# Patient Record
Sex: FEMALE | Race: BLACK OR AFRICAN AMERICAN | Employment: UNEMPLOYED | URBAN - METROPOLITAN AREA
[De-identification: names, ages, dates, MRNs, and addresses within clinical notes are randomized per-mention and may not be internally consistent; named-entity substitution may affect disease eponyms.]

---

## 2017-02-24 ENCOUNTER — HOSPITAL ENCOUNTER (EMERGENCY)
Age: 22
Discharge: HOME | End: 2017-02-24
Attending: EMERGENCY MEDICINE
Payer: MEDICAID

## 2017-02-24 VITALS
DIASTOLIC BLOOD PRESSURE: 85 MMHG | SYSTOLIC BLOOD PRESSURE: 147 MMHG | OXYGEN SATURATION: 100 % | HEIGHT: 63 IN | WEIGHT: 110 LBS | BODY MASS INDEX: 19.49 KG/M2 | TEMPERATURE: 98.3 F | RESPIRATION RATE: 16 BRPM | HEART RATE: 97 BPM

## 2017-02-24 DIAGNOSIS — N30.00 ACUTE CYSTITIS WITHOUT HEMATURIA: Primary | ICD-10-CM

## 2017-02-24 LAB
APPEARANCE UR: ABNORMAL
BACTERIA URNS QL MICRO: ABNORMAL /HPF
BILIRUB UR QL: NEGATIVE
CLUE CELLS VAG QL WET PREP: NORMAL
COLOR UR: ABNORMAL
EPITH CASTS URNS QL MICRO: ABNORMAL /LPF
GLUCOSE UR STRIP.AUTO-MCNC: NEGATIVE MG/DL
HCG UR QL: NEGATIVE
HGB UR QL STRIP: NEGATIVE
KETONES UR QL STRIP.AUTO: NEGATIVE MG/DL
KOH PREP SPEC: NORMAL
LEUKOCYTE ESTERASE UR QL STRIP.AUTO: ABNORMAL
NITRITE UR QL STRIP.AUTO: POSITIVE
PH UR STRIP: 6 [PH] (ref 5–8)
PROT UR STRIP-MCNC: NEGATIVE MG/DL
RBC #/AREA URNS HPF: ABNORMAL /HPF (ref 0–5)
SERVICE CMNT-IMP: NORMAL
SP GR UR REFRACTOMETRY: 1.02 (ref 1–1.03)
T VAGINALIS VAG QL WET PREP: NORMAL
UA: UC IF INDICATED,UAUC: ABNORMAL
UROBILINOGEN UR QL STRIP.AUTO: 1 EU/DL (ref 0.2–1)
WBC URNS QL MICRO: ABNORMAL /HPF (ref 0–4)

## 2017-02-24 PROCEDURE — 99284 EMERGENCY DEPT VISIT MOD MDM: CPT

## 2017-02-24 PROCEDURE — 87086 URINE CULTURE/COLONY COUNT: CPT | Performed by: EMERGENCY MEDICINE

## 2017-02-24 PROCEDURE — 87210 SMEAR WET MOUNT SALINE/INK: CPT | Performed by: PHYSICIAN ASSISTANT

## 2017-02-24 PROCEDURE — 87077 CULTURE AEROBIC IDENTIFY: CPT | Performed by: EMERGENCY MEDICINE

## 2017-02-24 PROCEDURE — 87186 SC STD MICRODIL/AGAR DIL: CPT | Performed by: EMERGENCY MEDICINE

## 2017-02-24 PROCEDURE — 87491 CHLMYD TRACH DNA AMP PROBE: CPT | Performed by: PHYSICIAN ASSISTANT

## 2017-02-24 PROCEDURE — 81025 URINE PREGNANCY TEST: CPT

## 2017-02-24 PROCEDURE — 81001 URINALYSIS AUTO W/SCOPE: CPT | Performed by: EMERGENCY MEDICINE

## 2017-02-24 RX ORDER — CEPHALEXIN 500 MG/1
500 CAPSULE ORAL 2 TIMES DAILY
Qty: 14 CAP | Refills: 0 | Status: SHIPPED | OUTPATIENT
Start: 2017-02-24 | End: 2017-03-03

## 2017-02-24 RX ORDER — CEPHALEXIN 500 MG/1
500 CAPSULE ORAL 2 TIMES DAILY
Qty: 14 CAP | Refills: 0 | Status: SHIPPED | OUTPATIENT
Start: 2017-02-24 | End: 2017-02-24

## 2017-02-24 NOTE — DISCHARGE INSTRUCTIONS
Urinary Tract Infection in Women: Care Instructions  Your Care Instructions    A urinary tract infection, or UTI, is a general term for an infection anywhere between the kidneys and the urethra (where urine comes out). Most UTIs are bladder infections. They often cause pain or burning when you urinate. UTIs are caused by bacteria and can be cured with antibiotics. Be sure to complete your treatment so that the infection goes away. Follow-up care is a key part of your treatment and safety. Be sure to make and go to all appointments, and call your doctor if you are having problems. It's also a good idea to know your test results and keep a list of the medicines you take. How can you care for yourself at home? · Take your antibiotics as directed. Do not stop taking them just because you feel better. You need to take the full course of antibiotics. · Drink extra water and other fluids for the next day or two. This may help wash out the bacteria that are causing the infection. (If you have kidney, heart, or liver disease and have to limit fluids, talk with your doctor before you increase your fluid intake.)  · Avoid drinks that are carbonated or have caffeine. They can irritate the bladder. · Urinate often. Try to empty your bladder each time. · To relieve pain, take a hot bath or lay a heating pad set on low over your lower belly or genital area. Never go to sleep with a heating pad in place. To prevent UTIs  · Drink plenty of water each day. This helps you urinate often, which clears bacteria from your system. (If you have kidney, heart, or liver disease and have to limit fluids, talk with your doctor before you increase your fluid intake.)  · Consider adding cranberry juice to your diet. · Urinate when you need to. · Urinate right after you have sex. · Change sanitary pads often. · Avoid douches, bubble baths, feminine hygiene sprays, and other feminine hygiene products that have deodorants.   · After going to the bathroom, wipe from front to back. When should you call for help? Call your doctor now or seek immediate medical care if:  · Symptoms such as fever, chills, nausea, or vomiting get worse or appear for the first time. · You have new pain in your back just below your rib cage. This is called flank pain. · There is new blood or pus in your urine. · You have any problems with your antibiotic medicine. Watch closely for changes in your health, and be sure to contact your doctor if:  · You are not getting better after taking an antibiotic for 2 days. · Your symptoms go away but then come back. Where can you learn more? Go to http://trudi-ottoniel.info/. Enter X759 in the search box to learn more about \"Urinary Tract Infection in Women: Care Instructions. \"  Current as of: August 12, 2016  Content Version: 11.1  © 6707-0313 [employer]. Care instructions adapted under license by [employer] (which disclaims liability or warranty for this information). If you have questions about a medical condition or this instruction, always ask your healthcare professional. [address] any warranty or liability for your use of this information.

## 2017-02-24 NOTE — ED PROVIDER NOTES
Patient is a 24 y.o. female presenting with frequency. The history is provided by the patient. Urinary Frequency    This is a new problem. Episode onset: 1wk pt c/o strong urinary odor and thinks she may have a UTI. The problem occurs intermittently. The problem has not changed since onset. The pain is at a severity of 0/10. The patient is experiencing no pain. There has been no fever. Associated symptoms include frequency and vaginal discharge (and itching). Pertinent negatives include no nausea, no vomiting and no abdominal pain. The patient is not pregnant. She has tried nothing for the symptoms. Past Medical History:   Diagnosis Date    Contact dermatitis and other eczema, due to unspecified cause        Past Surgical History:   Procedure Laterality Date    HX OTHER SURGICAL      eye surgery as a baby    HX OTHER SURGICAL      gum surgery as a child         Family History:   Problem Relation Age of Onset    Asthma Mother     Hypertension Mother     Hypertension Father        Social History     Social History    Marital status: SINGLE     Spouse name: N/A    Number of children: N/A    Years of education: N/A     Occupational History    Not on file. Social History Main Topics    Smoking status: Current Every Day Smoker     Packs/day: 0.25     Years: 7.00     Types: Cigarettes, Cigars    Smokeless tobacco: Never Used    Alcohol use No      Comment: Drinks 2 to 3 times a week    Drug use: No    Sexual activity: Yes     Partners: Male     Birth control/ protection: Injection     Other Topics Concern    Not on file     Social History Narrative         ALLERGIES: Review of patient's allergies indicates no known allergies. Review of Systems   Constitutional: Negative for fever. Gastrointestinal: Negative for abdominal pain, nausea and vomiting. Genitourinary: Positive for frequency and vaginal discharge (and itching). Negative for difficulty urinating and dysuria.    Musculoskeletal: Negative for gait problem. Skin: Negative. All other systems reviewed and are negative. Vitals:    02/24/17 1114   BP: 147/85   Pulse: 97   Resp: 16   Temp: 98.3 °F (36.8 °C)   SpO2: 100%   Weight: 49.9 kg (110 lb)   Height: 5' 3\" (1.6 m)            Physical Exam   Constitutional: She is oriented to person, place, and time. She appears well-developed and well-nourished. No distress. HENT:   Head: Normocephalic and atraumatic. Eyes: Conjunctivae are normal.   Cardiovascular: Normal rate, regular rhythm and normal heart sounds. Pulmonary/Chest: Effort normal and breath sounds normal. No respiratory distress. She has no wheezes. She has no rales. Abdominal: Soft. Bowel sounds are normal. She exhibits no distension. There is no tenderness. There is no rebound and no guarding. Genitourinary: Pelvic exam was performed with patient prone. Cervix exhibits discharge. Cervix exhibits no motion tenderness. Right adnexum displays no tenderness. Left adnexum displays no tenderness. Vaginal discharge (scant amount of thin whitish discharge present) found. Musculoskeletal: Normal range of motion. Neurological: She is alert and oriented to person, place, and time. Skin: Skin is warm and dry. Psychiatric: She has a normal mood and affect. Her behavior is normal. Judgment and thought content normal.   Nursing note and vitals reviewed.        MDM  Number of Diagnoses or Management Options  Diagnosis management comments: DDX: UTI, pregnancy, STI, vaginitis, yeast    Progress Note:  Pt declined prophylatic tx of STI at this time       Amount and/or Complexity of Data Reviewed  Clinical lab tests: ordered and reviewed      ED Course       Procedures

## 2017-02-26 LAB
BACTERIA SPEC CULT: ABNORMAL
CC UR VC: ABNORMAL
SERVICE CMNT-IMP: ABNORMAL

## 2017-02-27 LAB
C TRACH DNA SPEC QL NAA+PROBE: NEGATIVE
N GONORRHOEA DNA SPEC QL NAA+PROBE: NEGATIVE
SAMPLE TYPE: NORMAL
SERVICE CMNT-IMP: NORMAL
SPECIMEN SOURCE: NORMAL

## 2017-04-19 ENCOUNTER — HOSPITAL ENCOUNTER (EMERGENCY)
Age: 22
Discharge: HOME | End: 2017-04-19
Attending: INTERNAL MEDICINE
Payer: MEDICAID

## 2017-04-19 VITALS
TEMPERATURE: 98 F | HEIGHT: 63 IN | HEART RATE: 95 BPM | BODY MASS INDEX: 18.82 KG/M2 | SYSTOLIC BLOOD PRESSURE: 135 MMHG | WEIGHT: 106.2 LBS | OXYGEN SATURATION: 100 % | RESPIRATION RATE: 16 BRPM | DIASTOLIC BLOOD PRESSURE: 80 MMHG

## 2017-04-19 DIAGNOSIS — Z32.01 PREGNANCY TEST PERFORMED, PREGNANCY CONFIRMED: Primary | ICD-10-CM

## 2017-04-19 LAB
APPEARANCE UR: CLEAR
BACTERIA URNS QL MICRO: NEGATIVE /HPF
BILIRUB UR QL: NEGATIVE
COLOR UR: NORMAL
EPITH CASTS URNS QL MICRO: NORMAL /LPF
GLUCOSE UR STRIP.AUTO-MCNC: NEGATIVE MG/DL
HCG SERPL-ACNC: 9838 MIU/ML (ref 0–6)
HCG UR QL: POSITIVE
HGB UR QL STRIP: NEGATIVE
KETONES UR QL STRIP.AUTO: NEGATIVE MG/DL
LEUKOCYTE ESTERASE UR QL STRIP.AUTO: NEGATIVE
NITRITE UR QL STRIP.AUTO: NEGATIVE
PH UR STRIP: 7 [PH] (ref 5–8)
PROT UR STRIP-MCNC: NEGATIVE MG/DL
RBC #/AREA URNS HPF: NORMAL /HPF (ref 0–5)
SP GR UR REFRACTOMETRY: 1.01 (ref 1–1.03)
UA: UC IF INDICATED,UAUC: NORMAL
UROBILINOGEN UR QL STRIP.AUTO: 1 EU/DL (ref 0.2–1)
WBC URNS QL MICRO: NORMAL /HPF (ref 0–4)

## 2017-04-19 PROCEDURE — 84702 CHORIONIC GONADOTROPIN TEST: CPT | Performed by: PHYSICIAN ASSISTANT

## 2017-04-19 PROCEDURE — 99283 EMERGENCY DEPT VISIT LOW MDM: CPT

## 2017-04-19 PROCEDURE — 36415 COLL VENOUS BLD VENIPUNCTURE: CPT | Performed by: PHYSICIAN ASSISTANT

## 2017-04-19 PROCEDURE — 81001 URINALYSIS AUTO W/SCOPE: CPT | Performed by: EMERGENCY MEDICINE

## 2017-04-19 PROCEDURE — 81025 URINE PREGNANCY TEST: CPT

## 2017-04-19 NOTE — ED NOTES
Pt in ED w/ complaint of urinary frequency & urgency and concern for pregnancy. Pt denies dysuria but states she sometimes feels \"pressure\". Pt states she was diagnosed w/ a UTI last month but have not completed her abx. Pt states her LMP was the beginning of March. Pt is A&O X 4 and appears in no distress. Emergency Department Nursing Plan of Care       The Nursing Plan of Care is developed from the Nursing assessment and Emergency Department Attending provider initial evaluation. The plan of care may be reviewed in the ED Provider note.     The Plan of Care was developed with the following considerations:   Patient / Family readiness to learn indicated by:verbalized understanding  Persons(s) to be included in education: patient  Barriers to Learning/Limitations:No    Signed     Jessee Draper RN    4/19/2017   7:56 PM

## 2017-04-19 NOTE — ED PROVIDER NOTES
HPI Comments: Pt reports recent UTI two months ago. Did not finish taking medication. Pt reports urinary frequency and urgency x 2 weeks. Denies dysuria, hematuria, change in vaginal discharge. Admits to intermittent crampy abd pain. Denies vag bleeding. No hx ectopic pregnancy or ovarian cyst. LMP: middle of march. Patient is a 24 y.o. female presenting with frequency. The history is provided by the patient. Urinary Frequency    This is a new problem. The pain is at a severity of 3/10. The pain is mild. There has been no fever. She is sexually active. Associated symptoms include frequency, urgency and abdominal pain. Pertinent negatives include no chills, no sweats, no nausea, no vomiting, no discharge, no hematuria, no hesitancy, no flank pain, no vaginal discharge and no back pain. Yes, the patient is pregnant. She has tried nothing for the symptoms. Past Medical History:   Diagnosis Date    Contact dermatitis and other eczema, due to unspecified cause        Past Surgical History:   Procedure Laterality Date    HX OTHER SURGICAL      eye surgery as a baby    HX OTHER SURGICAL      gum surgery as a child         Family History:   Problem Relation Age of Onset    Asthma Mother     Hypertension Mother     Hypertension Father        Social History     Social History    Marital status: SINGLE     Spouse name: N/A    Number of children: N/A    Years of education: N/A     Occupational History    Not on file. Social History Main Topics    Smoking status: Current Every Day Smoker     Packs/day: 0.25     Years: 7.00     Types: Cigarettes, Cigars    Smokeless tobacco: Never Used    Alcohol use Yes      Comment: Drinks 2 to 3 times a week    Drug use: No    Sexual activity: Yes     Partners: Male     Birth control/ protection: Injection     Other Topics Concern    Not on file     Social History Narrative         ALLERGIES: Review of patient's allergies indicates no known allergies.     Review of Systems   Constitutional: Negative for chills and fever. Respiratory: Negative for chest tightness and shortness of breath. Cardiovascular: Negative for chest pain. Gastrointestinal: Positive for abdominal pain. Negative for abdominal distention, constipation, diarrhea, nausea and vomiting. Genitourinary: Positive for frequency and urgency. Negative for dyspareunia, dysuria, flank pain, hematuria, hesitancy, pelvic pain, vaginal bleeding, vaginal discharge and vaginal pain. Musculoskeletal: Negative for back pain and myalgias. Skin: Negative for color change, pallor, rash and wound. Neurological: Negative for dizziness, weakness and light-headedness. All other systems reviewed and are negative. Vitals:    04/19/17 1911   BP: 135/80   Pulse: 95   Resp: 16   Temp: 98 °F (36.7 °C)   SpO2: 100%   Weight: 48.2 kg (106 lb 3.2 oz)   Height: 5' 3\" (1.6 m)            Physical Exam   Constitutional: She is oriented to person, place, and time. She appears well-developed and well-nourished. No distress. HENT:   Head: Normocephalic and atraumatic. Eyes: Conjunctivae are normal.   Cardiovascular: Normal rate, regular rhythm and normal heart sounds. Pulmonary/Chest: Effort normal and breath sounds normal. No respiratory distress. She has no wheezes. She has no rales. Abdominal: Soft. Bowel sounds are normal. She exhibits no distension. There is no tenderness. There is no rebound and no guarding. Pain not reproducible on exam   Musculoskeletal: Normal range of motion. Neurological: She is alert and oriented to person, place, and time. No cranial nerve deficit. Skin: Skin is warm. No rash noted. Psychiatric: She has a normal mood and affect. Her behavior is normal.   Nursing note and vitals reviewed.        MDM  Number of Diagnoses or Management Options  Pregnancy test performed, pregnancy confirmed:   Diagnosis management comments: DDx: Pregnancy, UTI, Pyelonephritis        Amount and/or Complexity of Data Reviewed  Clinical lab tests: ordered and reviewed  Discuss the patient with other providers: yes (Discussed pt with Dr. Eric Estrada. No ultrasound ordered due to lack of pain on physical exam and vaginal bleeding. )      ED Course       Procedures        I personally saw and examined the patient. I found the following on physical exam:    Abdomen - soft, NT, ND    I have reviewed and agree with the MLP's findings, including all diagnostic interpretations, and plans as written. I was present during the key portions of separately billed procedures. [place] MD         Discussed results with pt including pregnancy. Discussed importance of prenatal vitamins and OB follow up in 48 hours for beta quant repeat. LABORATORY TESTS:  Recent Results (from the past 12 hour(s))   URINALYSIS W/ REFLEX CULTURE    Collection Time: 04/19/17  7:28 PM   Result Value Ref Range    Color YELLOW/STRAW      Appearance CLEAR CLEAR      Specific gravity 1.010 1.003 - 1.030      pH (UA) 7.0 5.0 - 8.0      Protein NEGATIVE  NEG mg/dL    Glucose NEGATIVE  NEG mg/dL    Ketone NEGATIVE  NEG mg/dL    Bilirubin NEGATIVE  NEG      Blood NEGATIVE  NEG      Urobilinogen 1.0 0.2 - 1.0 EU/dL    Nitrites NEGATIVE  NEG      Leukocyte Esterase NEGATIVE  NEG      WBC 0-4 0 - 4 /hpf    RBC 0-5 0 - 5 /hpf    Epithelial cells FEW FEW /lpf    Bacteria NEGATIVE  NEG /hpf    UA:UC IF INDICATED CULTURE NOT INDICATED BY UA RESULT CNI     HCG URINE, QL. - POC    Collection Time: 04/19/17  7:29 PM   Result Value Ref Range    Pregnancy test,urine (POC) POSITIVE (A) NEG         IMAGING RESULTS:  No orders to display       MEDICATIONS GIVEN:  Medications - No data to display    IMPRESSION:  1. Pregnancy test performed, pregnancy confirmed        PLAN:  1. Current Discharge Medication List      START taking these medications    Details   prenatal multivit-ca-min-fe-fa (PRENATAL VITAMIN) tab Take 1 Tab by mouth daily.   Qty: 30 Tab, Refills: 0 2.   Follow-up Information     Follow up With Details Comments Contact Info    Ken Grace MD Schedule an appointment as soon as possible for a visit 48 hours for follow up beta quant [address] [phone]          Return to ED if worse

## 2017-04-19 NOTE — ED TRIAGE NOTES
Pt reports urinary frequency, reports left sided abdominal pain x 2 days, intermittent, reports missed menses. Pt reports being treated for UTI recently without completing antibiotics.

## 2017-04-20 NOTE — ED NOTES
Patient (s) was given copy of dc instructions and one paper script(s) and no electronic scripts. Patient (s) has verbalized understanding of instructions and script (s). Patient was given a current medication reconciliation form and verbalized understanding of their medications. Patient (s) has verbalized understanding of the importance of discussing medications with  his or her physician or clinic they will be following up with. Patient alert and oriented and in no acute distress. Patient offered wheelchair from treatment area to hospital entrance, patient declined wheelchair. Patient left ED with family.

## 2017-04-20 NOTE — DISCHARGE INSTRUCTIONS
Learning About Pregnancy  Your Care Instructions  Your health in the early weeks of your pregnancy is particularly important for your babys health. Take good care of yourself. Anything you do that harms your body can also harm your baby. Make sure to go to all of your doctor appointments. Regular checkups will help keep you and your baby healthy. Follow-up care is a key part of your treatment and safety. Be sure to make and go to all appointments, and call your doctor if you are having problems. Its also a good idea to know your test results and keep a list of the medicines you take. How can you care for yourself at home? Diet  · Eat a balanced diet. Make sure your diet includes plenty of beans, peas, and leafy green vegetables. · Do not skip meals or go for many hours without eating. If you are nauseated, try to eat a small, healthy snack every 2 to 3 hours. · Do not eat fish that has a high level of mercury, such as shark, swordfish, or mackerel. Do not eat more than one can of tuna each week. · Drink plenty of fluids, enough so that your urine is light yellow or clear like water. If you have kidney, heart, or liver disease and have to limit fluids, talk with your doctor before you increase the amount of fluids you drink. · Cut down on caffeine, such as coffee, tea, and cola. · Do not drink alcohol, such as beer, wine, or hard liquor. · Take a multivitamin that contains at least 400 micrograms (mcg) of folic acid to help prevent birth defects. Fortified cereal and whole wheat bread are good additional sources of folic acid. · Increase the calcium in your diet. Try to drink a quart of skim milk each day. You may also take calcium supplements and choose foods such as cheese and yogurt. Lifestyle  · Make sure you go to your follow-up appointments. · Get plenty of rest. You may be unusually tired while you are pregnant. · Get at least 30 minutes of exercise on most days of the week.  Walking is a good choice. If you have not exercised in the past, start out slowly. Take several short walks each day. · Do not smoke. If you need help quitting, talk to your doctor about stop-smoking programs. These can increase your chances of quitting for good. · Do not touch cat feces or litter boxes. Also, wash your hands after you handle raw meat, and fully cook all meat before you eat it. Wear gloves when you work in the yard or garden, and wash your hands well when you are done. Cat feces, raw or undercooked meat, and contaminated dirt can cause an infection that may harm your baby or lead to a miscarriage. · Do not use saunas or hot tubs. Raising your body temperature may harm your baby. · Avoid chemical fumes, paint fumes, or poisons. · Do not use illegal drugs or alcohol. Medicines  · Review all of your medicines with your doctor. Some of your routine medicines may need to be changed to protect your baby. · Use acetaminophen (Tylenol) to relieve minor problems, such as a mild headache or backache or a mild fever with cold symptoms. Do not use nonsteroidal anti-inflammatory drugs (NSAIDs), such as ibuprofen (Advil, Motrin) or naproxen (Aleve), unless your doctor says it is okay. · Do not take two or more pain medicines at the same time unless the doctor told you to. Many pain medicines have acetaminophen, which is Tylenol. Too much acetaminophen (Tylenol) can be harmful. · Take your medicines exactly as prescribed. Call your doctor if you think you are having a problem with your medicine. To manage morning sickness  · If you feel sick when you first wake up, try eating a small snack (such as crackers) before you get out of bed. Allow some time to digest the snack, and then get out of bed slowly. · Do not skip meals or go for long periods without eating. An empty stomach can make nausea worse. · Eat small, frequent meals instead of three large meals each day. · Drink plenty of fluids.  Sports drinks, such as Gatorade or Powerade, are good choices. · Eat foods that are high in protein but low in fat. · If you are taking iron supplements, ask your doctor if they are necessary. Iron can make nausea worse. · Avoid any smells, such as coffee, that make you feel sick. · Get lots of rest. Morning sickness may be worse when you are tired. Where can you learn more? Go to http://trudi-ottoniel.info/. Enter I053 in the search box to learn more about \"Learning About Pregnancy. \"  Current as of: May 30, 2016  Content Version: 11.2  © 1931-0436 [employer] Care instructions adapted under license by [employer] (which disclaims liability or warranty for this information). If you have questions about a medical condition or this instruction, always ask your healthcare professional. [address] any warranty or liability for your use of this information.

## 2017-05-09 LAB
ANTIBODY SCREEN, EXTERNAL: NEGATIVE
CHLAMYDIA, EXTERNAL: NEGATIVE
HBSAG, EXTERNAL: NEGATIVE
HIV, EXTERNAL: NONREACTIVE
N. GONORRHEA, EXTERNAL: NEGATIVE
RUBELLA, EXTERNAL: NORMAL

## 2017-10-27 LAB — T. PALLIDUM, EXTERNAL: NEGATIVE

## 2017-12-03 ENCOUNTER — ANESTHESIA EVENT (OUTPATIENT)
Dept: LABOR AND DELIVERY | Age: 22
DRG: 560 | End: 2017-12-03
Payer: MEDICAID

## 2017-12-03 ENCOUNTER — ANESTHESIA (OUTPATIENT)
Dept: LABOR AND DELIVERY | Age: 22
DRG: 560 | End: 2017-12-03
Payer: MEDICAID

## 2017-12-03 ENCOUNTER — HOSPITAL ENCOUNTER (INPATIENT)
Age: 22
LOS: 2 days | Discharge: HOME | DRG: 560 | End: 2017-12-05
Attending: OBSTETRICS & GYNECOLOGY | Admitting: OBSTETRICS & GYNECOLOGY
Payer: MEDICAID

## 2017-12-03 PROBLEM — O45.90 PLACENTAL ABRUPTION AFFECTING DELIVERY: Status: ACTIVE | Noted: 2017-12-03

## 2017-12-03 LAB
ABO + RH BLD: NORMAL
AMPHET UR QL SCN: NEGATIVE
BARBITURATES UR QL SCN: NEGATIVE
BENZODIAZ UR QL: NEGATIVE
BLOOD GROUP ANTIBODIES SERPL: NORMAL
CANNABINOIDS UR QL SCN: POSITIVE
COCAINE UR QL SCN: NEGATIVE
DRUG SCRN COMMENT,DRGCM: ABNORMAL
ERYTHROCYTE [DISTWIDTH] IN BLOOD BY AUTOMATED COUNT: 14 % (ref 11.5–14.5)
FETAL BLOOD VOL PATIENT KLEIH BETKE: NORMAL ML
HCT VFR BLD AUTO: 30.1 % (ref 35–47)
HGB BLD-MCNC: 10.1 G/DL (ref 11.5–16)
MCH RBC QN AUTO: 31.2 PG (ref 26–34)
MCHC RBC AUTO-ENTMCNC: 33.6 G/DL (ref 30–36.5)
MCV RBC AUTO: 92.9 FL (ref 80–99)
METHADONE UR QL: NEGATIVE
OPIATES UR QL: NEGATIVE
PCP UR QL: NEGATIVE
PLATELET # BLD AUTO: 207 K/UL (ref 150–400)
RBC # BLD AUTO: 3.24 M/UL (ref 3.8–5.2)
SPECIMEN EXP DATE BLD: NORMAL
WBC # BLD AUTO: 6.6 K/UL (ref 3.6–11)

## 2017-12-03 PROCEDURE — 99285 EMERGENCY DEPT VISIT HI MDM: CPT

## 2017-12-03 PROCEDURE — 77030011943

## 2017-12-03 PROCEDURE — 10907ZC DRAINAGE OF AMNIOTIC FLUID, THERAPEUTIC FROM PRODUCTS OF CONCEPTION, VIA NATURAL OR ARTIFICIAL OPENING: ICD-10-PCS | Performed by: OBSTETRICS & GYNECOLOGY

## 2017-12-03 PROCEDURE — 74011250636 HC RX REV CODE- 250/636: Performed by: OBSTETRICS & GYNECOLOGY

## 2017-12-03 PROCEDURE — 85027 COMPLETE CBC AUTOMATED: CPT | Performed by: OBSTETRICS & GYNECOLOGY

## 2017-12-03 PROCEDURE — 65410000002 HC RM PRIVATE OB

## 2017-12-03 PROCEDURE — 86900 BLOOD TYPING SEROLOGIC ABO: CPT | Performed by: OBSTETRICS & GYNECOLOGY

## 2017-12-03 PROCEDURE — 76060000078 HC EPIDURAL ANESTHESIA

## 2017-12-03 PROCEDURE — 59025 FETAL NON-STRESS TEST: CPT

## 2017-12-03 PROCEDURE — 3E0R3BZ INTRODUCTION OF ANESTHETIC AGENT INTO SPINAL CANAL, PERCUTANEOUS APPROACH: ICD-10-PCS | Performed by: ANESTHESIOLOGY

## 2017-12-03 PROCEDURE — 76815 OB US LIMITED FETUS(S): CPT

## 2017-12-03 PROCEDURE — 74011250636 HC RX REV CODE- 250/636: Performed by: ANESTHESIOLOGY

## 2017-12-03 PROCEDURE — 74011000250 HC RX REV CODE- 250

## 2017-12-03 PROCEDURE — 80307 DRUG TEST PRSMV CHEM ANLYZR: CPT | Performed by: OBSTETRICS & GYNECOLOGY

## 2017-12-03 PROCEDURE — 85460 HEMOGLOBIN FETAL: CPT | Performed by: OBSTETRICS & GYNECOLOGY

## 2017-12-03 PROCEDURE — 74011250636 HC RX REV CODE- 250/636

## 2017-12-03 PROCEDURE — 00HU33Z INSERTION OF INFUSION DEVICE INTO SPINAL CANAL, PERCUTANEOUS APPROACH: ICD-10-PCS | Performed by: ANESTHESIOLOGY

## 2017-12-03 PROCEDURE — 77030007880 HC KT SPN EPDRL BBMI -B

## 2017-12-03 PROCEDURE — 74011250637 HC RX REV CODE- 250/637

## 2017-12-03 PROCEDURE — 36415 COLL VENOUS BLD VENIPUNCTURE: CPT | Performed by: OBSTETRICS & GYNECOLOGY

## 2017-12-03 PROCEDURE — 75410000003 HC RECOV DEL/VAG/CSECN EA 0.5 HR

## 2017-12-03 PROCEDURE — 75410000000 HC DELIVERY VAGINAL/SINGLE

## 2017-12-03 PROCEDURE — 75410000002 HC LABOR FEE PER 1 HR

## 2017-12-03 PROCEDURE — 96360 HYDRATION IV INFUSION INIT: CPT

## 2017-12-03 RX ORDER — FENTANYL CITRATE 50 UG/ML
INJECTION, SOLUTION INTRAMUSCULAR; INTRAVENOUS AS NEEDED
Status: DISCONTINUED | OUTPATIENT
Start: 2017-12-03 | End: 2017-12-04 | Stop reason: HOSPADM

## 2017-12-03 RX ORDER — SODIUM CHLORIDE 0.9 % (FLUSH) 0.9 %
5-10 SYRINGE (ML) INJECTION EVERY 8 HOURS
Status: DISCONTINUED | OUTPATIENT
Start: 2017-12-03 | End: 2017-12-03

## 2017-12-03 RX ORDER — FENTANYL CITRATE 50 UG/ML
100 INJECTION, SOLUTION INTRAMUSCULAR; INTRAVENOUS ONCE
Status: DISCONTINUED | OUTPATIENT
Start: 2017-12-03 | End: 2017-12-03 | Stop reason: HOSPADM

## 2017-12-03 RX ORDER — LIDOCAINE HYDROCHLORIDE AND EPINEPHRINE 15; 5 MG/ML; UG/ML
INJECTION, SOLUTION EPIDURAL AS NEEDED
Status: DISCONTINUED | OUTPATIENT
Start: 2017-12-03 | End: 2017-12-04 | Stop reason: HOSPADM

## 2017-12-03 RX ORDER — AMMONIA 15 % (W/V)
1 AMPUL (EA) INHALATION AS NEEDED
Status: DISCONTINUED | OUTPATIENT
Start: 2017-12-03 | End: 2017-12-05 | Stop reason: HOSPADM

## 2017-12-03 RX ORDER — ZOLPIDEM TARTRATE 5 MG/1
5 TABLET ORAL
Status: DISCONTINUED | OUTPATIENT
Start: 2017-12-03 | End: 2017-12-05 | Stop reason: HOSPADM

## 2017-12-03 RX ORDER — NALBUPHINE HYDROCHLORIDE 10 MG/ML
10 INJECTION, SOLUTION INTRAMUSCULAR; INTRAVENOUS; SUBCUTANEOUS
Status: DISCONTINUED | OUTPATIENT
Start: 2017-12-03 | End: 2017-12-03 | Stop reason: HOSPADM

## 2017-12-03 RX ORDER — AMOXICILLIN 500 MG/1
500 TABLET, FILM COATED ORAL 3 TIMES DAILY
COMMUNITY
End: 2017-12-05

## 2017-12-03 RX ORDER — OXYTOCIN IN 5 % DEXTROSE 30/500 ML
PLASTIC BAG, INJECTION (ML) INTRAVENOUS
Status: COMPLETED
Start: 2017-12-03 | End: 2017-12-03

## 2017-12-03 RX ORDER — OXYTOCIN/RINGER'S LACTATE 20/1000 ML
125-1000 PLASTIC BAG, INJECTION (ML) INTRAVENOUS AS NEEDED
Status: DISCONTINUED | OUTPATIENT
Start: 2017-12-03 | End: 2017-12-05 | Stop reason: HOSPADM

## 2017-12-03 RX ORDER — BUPIVACAINE HYDROCHLORIDE 5 MG/ML
INJECTION, SOLUTION EPIDURAL; INTRACAUDAL
Status: COMPLETED
Start: 2017-12-03 | End: 2017-12-03

## 2017-12-03 RX ORDER — HYDROCORTISONE ACETATE PRAMOXINE HCL 2.5; 1 G/100G; G/100G
CREAM TOPICAL AS NEEDED
Status: DISCONTINUED | OUTPATIENT
Start: 2017-12-03 | End: 2017-12-05 | Stop reason: HOSPADM

## 2017-12-03 RX ORDER — SODIUM CHLORIDE 0.9 % (FLUSH) 0.9 %
5-10 SYRINGE (ML) INJECTION EVERY 8 HOURS
Status: DISCONTINUED | OUTPATIENT
Start: 2017-12-04 | End: 2017-12-05 | Stop reason: HOSPADM

## 2017-12-03 RX ORDER — ONDANSETRON 2 MG/ML
4 INJECTION INTRAMUSCULAR; INTRAVENOUS
Status: DISCONTINUED | OUTPATIENT
Start: 2017-12-03 | End: 2017-12-03 | Stop reason: HOSPADM

## 2017-12-03 RX ORDER — FENTANYL/BUPIVACAINE/NS/PF 2-1250MCG
1-16 PREFILLED PUMP RESERVOIR EPIDURAL CONTINUOUS
Status: DISCONTINUED | OUTPATIENT
Start: 2017-12-03 | End: 2017-12-03

## 2017-12-03 RX ORDER — FENTANYL CITRATE 50 UG/ML
100 INJECTION, SOLUTION INTRAMUSCULAR; INTRAVENOUS ONCE
Status: COMPLETED | OUTPATIENT
Start: 2017-12-03 | End: 2017-12-03

## 2017-12-03 RX ORDER — TRIPROLIDINE/PSEUDOEPHEDRINE 2.5MG-60MG
800 TABLET ORAL
Status: DISCONTINUED | OUTPATIENT
Start: 2017-12-03 | End: 2017-12-04

## 2017-12-03 RX ORDER — BUPIVACAINE HYDROCHLORIDE 5 MG/ML
30 INJECTION, SOLUTION EPIDURAL; INTRACAUDAL ONCE
Status: DISCONTINUED | OUTPATIENT
Start: 2017-12-03 | End: 2017-12-03

## 2017-12-03 RX ORDER — SODIUM CHLORIDE, SODIUM LACTATE, POTASSIUM CHLORIDE, CALCIUM CHLORIDE 600; 310; 30; 20 MG/100ML; MG/100ML; MG/100ML; MG/100ML
125 INJECTION, SOLUTION INTRAVENOUS CONTINUOUS
Status: DISCONTINUED | OUTPATIENT
Start: 2017-12-03 | End: 2017-12-03

## 2017-12-03 RX ORDER — BUPIVACAINE HYDROCHLORIDE 2.5 MG/ML
INJECTION, SOLUTION EPIDURAL; INFILTRATION; INTRACAUDAL AS NEEDED
Status: DISCONTINUED | OUTPATIENT
Start: 2017-12-03 | End: 2017-12-04 | Stop reason: HOSPADM

## 2017-12-03 RX ORDER — DOCUSATE SODIUM 100 MG/1
100 CAPSULE, LIQUID FILLED ORAL
Status: DISCONTINUED | OUTPATIENT
Start: 2017-12-03 | End: 2017-12-05 | Stop reason: HOSPADM

## 2017-12-03 RX ORDER — SODIUM CHLORIDE 0.9 % (FLUSH) 0.9 %
5-10 SYRINGE (ML) INJECTION AS NEEDED
Status: DISCONTINUED | OUTPATIENT
Start: 2017-12-03 | End: 2017-12-05 | Stop reason: HOSPADM

## 2017-12-03 RX ORDER — SODIUM CHLORIDE 0.9 % (FLUSH) 0.9 %
5-10 SYRINGE (ML) INJECTION AS NEEDED
Status: DISCONTINUED | OUTPATIENT
Start: 2017-12-03 | End: 2017-12-03

## 2017-12-03 RX ORDER — SIMETHICONE 80 MG
80 TABLET,CHEWABLE ORAL
Status: DISCONTINUED | OUTPATIENT
Start: 2017-12-03 | End: 2017-12-05 | Stop reason: HOSPADM

## 2017-12-03 RX ORDER — BUPIVACAINE HYDROCHLORIDE 5 MG/ML
30 INJECTION, SOLUTION EPIDURAL; INTRACAUDAL AS NEEDED
Status: DISCONTINUED | OUTPATIENT
Start: 2017-12-03 | End: 2017-12-03 | Stop reason: HOSPADM

## 2017-12-03 RX ORDER — NALOXONE HYDROCHLORIDE 0.4 MG/ML
0.4 INJECTION, SOLUTION INTRAMUSCULAR; INTRAVENOUS; SUBCUTANEOUS AS NEEDED
Status: DISCONTINUED | OUTPATIENT
Start: 2017-12-03 | End: 2017-12-03 | Stop reason: SDUPTHER

## 2017-12-03 RX ORDER — NALOXONE HYDROCHLORIDE 0.4 MG/ML
0.4 INJECTION, SOLUTION INTRAMUSCULAR; INTRAVENOUS; SUBCUTANEOUS AS NEEDED
Status: DISCONTINUED | OUTPATIENT
Start: 2017-12-03 | End: 2017-12-03 | Stop reason: HOSPADM

## 2017-12-03 RX ORDER — OXYTOCIN IN 5 % DEXTROSE 30/500 ML
1-25 PLASTIC BAG, INJECTION (ML) INTRAVENOUS
Status: DISCONTINUED | OUTPATIENT
Start: 2017-12-03 | End: 2017-12-03

## 2017-12-03 RX ORDER — MINERAL OIL
OIL (ML) ORAL
Status: COMPLETED
Start: 2017-12-03 | End: 2017-12-03

## 2017-12-03 RX ORDER — ACETAMINOPHEN 325 MG/1
650 TABLET ORAL
Status: DISCONTINUED | OUTPATIENT
Start: 2017-12-03 | End: 2017-12-03 | Stop reason: HOSPADM

## 2017-12-03 RX ADMIN — BUPIVACAINE HYDROCHLORIDE 150 MG: 5 INJECTION, SOLUTION EPIDURAL; INTRACAUDAL at 17:43

## 2017-12-03 RX ADMIN — SODIUM CHLORIDE, SODIUM LACTATE, POTASSIUM CHLORIDE, AND CALCIUM CHLORIDE 125 ML/HR: 600; 310; 30; 20 INJECTION, SOLUTION INTRAVENOUS at 16:04

## 2017-12-03 RX ADMIN — LIDOCAINE HYDROCHLORIDE AND EPINEPHRINE 3 ML: 15; 5 INJECTION, SOLUTION EPIDURAL at 14:08

## 2017-12-03 RX ADMIN — LIDOCAINE HYDROCHLORIDE AND EPINEPHRINE 2 ML: 15; 5 INJECTION, SOLUTION EPIDURAL at 14:06

## 2017-12-03 RX ADMIN — SODIUM CHLORIDE, SODIUM LACTATE, POTASSIUM CHLORIDE, AND CALCIUM CHLORIDE 999 ML/HR: 600; 310; 30; 20 INJECTION, SOLUTION INTRAVENOUS at 13:30

## 2017-12-03 RX ADMIN — SODIUM CHLORIDE, SODIUM LACTATE, POTASSIUM CHLORIDE, AND CALCIUM CHLORIDE 500 ML: 600; 310; 30; 20 INJECTION, SOLUTION INTRAVENOUS at 02:54

## 2017-12-03 RX ADMIN — BUPIVACAINE HYDROCHLORIDE 3 ML: 2.5 INJECTION, SOLUTION EPIDURAL; INFILTRATION; INTRACAUDAL at 14:11

## 2017-12-03 RX ADMIN — FENTANYL CITRATE 100 MCG: 50 INJECTION, SOLUTION INTRAMUSCULAR; INTRAVENOUS at 17:44

## 2017-12-03 RX ADMIN — MINERAL OIL 30 ML: 99.9 LIQUID ORAL; TOPICAL at 19:10

## 2017-12-03 RX ADMIN — Medication 1 MILLI-UNITS/MIN: at 10:11

## 2017-12-03 RX ADMIN — BUPIVACAINE HYDROCHLORIDE 2 ML: 2.5 INJECTION, SOLUTION EPIDURAL; INFILTRATION; INTRACAUDAL at 14:09

## 2017-12-03 RX ADMIN — SODIUM CHLORIDE, SODIUM LACTATE, POTASSIUM CHLORIDE, AND CALCIUM CHLORIDE 125 ML/HR: 600; 310; 30; 20 INJECTION, SOLUTION INTRAVENOUS at 07:15

## 2017-12-03 RX ADMIN — FENTANYL CITRATE 100 MCG: 50 INJECTION, SOLUTION INTRAMUSCULAR; INTRAVENOUS at 14:07

## 2017-12-03 RX ADMIN — FENTANYL 0.2 MG/100ML-BUPIV 0.125%-NACL 0.9% EPIDURAL INJ 10 ML/HR: 2/0.125 SOLUTION at 14:20

## 2017-12-03 NOTE — PROGRESS NOTES
7879 Patient arrived ambulatory from home with c/o bright red bleeding. States positive fetal movement. Denies LOF , contractions, or recent sexual intercourse. Chencho Hutchinson at bedside. Discussed POC.  Spec exam. SVE 1/50  0208 Bedside U/S

## 2017-12-03 NOTE — IP AVS SNAPSHOT
[address] 
[phone] Patient: Zoanne Babinski MRN: [id number] :1995 About your hospitalization You were admitted on:  December 3, 2017 You last received care in the:  [address] [facility] You were discharged on:  2017 Why you were hospitalized Your primary diagnosis was:  Not on File Your diagnoses also included:  Placental Abruption Affecting Delivery Things You Need To Do (next 8 weeks) Follow up with Robles Avina MD  
  
Phone:  [phone] Where:  [address] [place] [address], [address], [facility] 1 [phone] Discharge Orders None A check nestor indicates which time of day the medication should be taken. My Medications STOP taking these medications   
 amoxicillin 500 mg Tab TAKE these medications as instructed Instructions Each Dose to Equal  
 Morning Noon Evening Bedtime  
 ibuprofen 600 mg tablet Commonly known as:  MOTRIN Your last dose was: Your next dose is: Take 1 Tab by mouth every six (6) hours as needed for Pain. 600 mg  
    
   
   
   
  
 oxyCODONE-acetaminophen 5-325 mg per tablet Commonly known as:  PERCOCET Your last dose was: Your next dose is: Take 1 Tab by mouth every four (4) hours as needed for Pain. Max Daily Amount: 6 Tabs. 1 Tab  
    
   
   
   
  
 prenatal multivit-ca-min-fe-fa Tab Commonly known as:  PRENATAL VITAMIN Your last dose was: Your next dose is: Take 1 Tab by mouth daily. 1 Tab Where to Get Your Medications Information on where to get these meds will be given to you by the nurse or doctor. ! Ask your nurse or doctor about these medications  
  ibuprofen 600 mg tablet  
 oxyCODONE-acetaminophen 5-325 mg per tablet Discharge Instructions After Your Delivery (the Postpartum Period): Care Instructions Your Care Instructions Congratulations on the birth of your baby. Like pregnancy, the  period can be a time of excitement, alicia, and exhaustion. You may look at your wondrous little baby and feel happy. You may also be overwhelmed by your new sleep hours and new responsibilities. At first, babies often sleep during the days and are awake at night. They do not have a pattern or routine. They may make sudden gasps, jerk themselves awake, or look like they have crossed eyes. These are all normal, and they may even make you smile. In these first weeks after delivery, try to take good care of yourself. It may take 4 to 6 weeks to feel like yourself again, and possibly longer if you had a  birth. You will likely feel very tired for several weeks. Your days will be full of ups and downs, but lots of alicia as well. Follow-up care is a key part of your treatment and safety. Be sure to make and go to all appointments, and call your doctor if you are having problems. It's also a good idea to know your test results and keep a list of the medicines you take. How can you care for yourself at home? Take care of your body after delivery · Use pads instead of tampons for the bloody flow that may last as long as 2 weeks. · Ease cramps with ibuprofen (Advil, Motrin). · Ease soreness of hemorrhoids and the area between your vagina and rectum with ice compresses or witch hazel pads. · Ease constipation by drinking lots of fluid and eating high-fiber foods. Ask your doctor about over-the-counter stool softeners. · Cleanse yourself with a gentle squeeze of warm water from a bottle instead of wiping with toilet paper. · Take a sitz bath in warm water several times a day. · Wear a good nursing bra. Ease sore and swollen breasts with warm, wet washcloths. · If you are not breastfeeding, use ice rather than heat for breast soreness. · Your period may not start for several months if you are breastfeeding. You may bleed more, and longer at first, than you did before you got pregnant. · Wait until you are healed (about 4 to 6 weeks) before you have sexual intercourse. Your doctor will tell you when it is okay to have sex. · Try not to travel with your baby for 5 or 6 weeks. If you take a long car trip, make frequent stops to walk around and stretch. Avoid exhaustion · Rest every day. Try to nap when your baby naps. · Ask another adult to be with you for a few days after delivery. · Plan for  if you have other children. · Stay flexible so you can eat at odd hours and sleep when you need to. Both you and your baby are making new schedules. · Plan small trips to get out of the house. Change can make you feel less tired. · Ask for help with housework, cooking, and shopping. Remind yourself that your job is to care for your baby. Know about help for postpartum depression · \"Baby blues\" are common for the first 1 to 2 weeks after birth. You may cry or feel sad or irritable for no reason. · Rest whenever you can. Being tired makes it harder to handle your emotions. · Go for walks with your baby. · Talk to your partner, friends, and family about your feelings. · If your symptoms last for more than a few weeks, or if you feel very depressed, ask your doctor for help. · Postpartum depression can be treated. Support groups and counseling can help. Sometimes medicine can also help. Stay healthy · Eat healthy foods so you have more energy, make good breast milk, and lose extra baby pounds. · If you breastfeed, avoid alcohol and drugs. Stay smoke-free. If you quit during pregnancy, congratulations. · Start daily exercise after 4 to 6 weeks, but rest when you feel tired. · Learn exercises to tone your belly. Do Kegel exercises to regain strength in your pelvic muscles. You can do these exercises while you stand or sit. ¨ Squeeze the same muscles you would use to stop your urine. Your belly and thighs should not move. ¨ Hold the squeeze for 3 seconds, and then relax for 3 seconds. ¨ Start with 3 seconds. Then add 1 second each week until you are able to squeeze for 10 seconds. ¨ Repeat the exercise 10 to 15 times for each session. Do three or more sessions each day. · Find a class for new mothers and new babies that has an exercise time. · If you had a  birth, give yourself a bit more time before you exercise, and be careful. When should you call for help? Call 911 anytime you think you may need emergency care. For example, call if: 
? · You passed out (lost consciousness). ?Call your doctor now or seek immediate medical care if: 
? · You have severe vaginal bleeding. This means you are passing blood clots and soaking through a pad each hour for 2 or more hours. ? · You are dizzy or lightheaded, or you feel like you may faint. ? · You have a fever. ? · You have new belly pain, or your pain gets worse. ? Watch closely for changes in your health, and be sure to contact your doctor if: 
? · Your vaginal bleeding seems to be getting heavier. ? · You have new or worse vaginal discharge. ? · You feel sad, anxious, or hopeless for more than a few days. ? · You do not get better as expected. Where can you learn more? Go to http://trudi-ottoniel.info/. Enter A461 in the search box to learn more about \"After Your Delivery (the Postpartum Period): Care Instructions. \" Current as of: 2017 Content Version: 11.4 © 1218-2430 [employer]. Care instructions adapted under license by [employer] (which disclaims liability or warranty for this information). If you have questions about a medical condition or this instruction, always ask your healthcare professional. [address] any warranty or liability for your use of this information. [employer] Announcement We are excited to announce that we are making your provider's discharge notes available to you in [employer]. You will see these notes when they are completed and signed by the physician that discharged you from your recent hospital stay. If you have any questions or concerns about any information you see in [employer], please call the Health Information Department where you were seen or reach out to your Primary Care Provider for more information about your plan of care. Introducing [facility]! Mac Weldon introduces [employer] patient portal. Now you can access parts of your medical record, email your doctor's office, and request medication refills online. 1. In your internet browser, go to [email or web address] 2. Click on the First Time User? Click Here link in the Sign In box. You will see the New Member Sign Up page. 3. Enter your [employer] Access Code exactly as it appears below. You will not need to use this code after youve completed the sign-up process. If you do not sign up before the expiration date, you must request a new code. · [employer] Access Code: [email or web address] Expires: 3/5/2018 12:37 PM 
 
4. Enter the last four digits of your Social Security Number (xxxx) and Date of Birth (mm/dd/yyyy) as indicated and click Submit. You will be taken to the next sign-up page. 5. Create a [employer] ID. This will be your [employer] login ID and cannot be changed, so think of one that is secure and easy to remember. 6. Create a [employer] password. You can change your password at any time. 7. Enter your Password Reset Question and Answer. This can be used at a later time if you forget your password. 8. Enter your e-mail address. You will receive e-mail notification when new information is available in [address] 9. Click Sign Up. You can now view and download portions of your medical record. 10. Click the Download Summary menu link to download a portable copy of your medical information. If you have questions, please visit the Frequently Asked Questions section of the [employer] website. Remember, [place] is NOT to be used for urgent needs. For medical emergencies, dial 911. Now available from your iPhone and Android! Providers Seen During Your Hospitalization Provider Specialty Primary office phone Claudean Peaches, MD Obstetrics & Gynecology [phone] Your Primary Care Physician (PCP) Primary Care Physician Office Phone Office Fax Rhina Moody [phone] You are allergic to the following No active allergies Recent Documentation Height Weight Breastfeeding? BMI OB Status Smoking Status 1.6 m 61.2 kg Unknown 23.91 kg/m2 Recent pregnancy Current Every Day Smoker Emergency Contacts Name Discharge Info Relation Home Work Mobile Kate Franks DISCHARGE CAREGIVER [3] Mother [14] [phone] Bashir Franks DISCHARGE CAREGIVER [3] Friend [5] [phone] Patient Belongings The following personal items are in your possession at time of discharge: 
  Dental Appliances: None  Visual Aid: Glasses      Home Medications: Kept at bedside   Jewelry: Ring  Clothing: Footwear, Shirt, Pants, Undergarments    Other Valuables: Cell Phone, Parrish Mcqueen  Personal Items Sent to Safe: none Please provide this summary of care documentation to your next provider. Signatures-by signing, you are acknowledging that this After Visit Summary has been reviewed with you and you have received a copy. Patient Signature:  ____________________________________________________________ Date:  ____________________________________________________________  
  
Jared Iyer Provider Signature:  ____________________________________________________________ Date:  ____________________________________________________________

## 2017-12-03 NOTE — PROGRESS NOTES
1565 Verbal shift change report given to LISA Agosto RN (oncoming nurse) by LOI Cordova RN (offgoing nurse). Report included the following information SBAR, Procedure Summary, Accordion and Recent Results. 08 Return to bed after vd. Passed a walnut size clot with small amount of vaginal bleeding after voiding. Discussed EFM and ucs. Pt admitted to using marijuana three days ago. [address] with Hernando Nash, on call for [facility] about vaginal bleeding/clot and unknown GBS status. Plans for admission and delivery of baby. MD to see pt soon. Pt changed to inpatient status. 09 Delivery consent completed. Pt made aware of plans, Hernando Nash to see pt.     Donell De Leon in to see pt. Viewed EFM strip & discussed POC which includes augmentation of labor. Scant red bleeding noted on alma pad. SVE 2-3/60/-3 GBS unknown, will hold antibiotics unless symptomatic. 1011 Results of KB noted as positive. Pitocin augmentation started per order and pt consent. [facility] in to see pt. Awaiting epid. MD requested VE after pt comfortable. [address] placed by [facility] ALEDO. [address] folllowed by SVE 6-7/90/-1 Moderate amount vaginal bleeding noted. [address] Viewed EFM strip. SVE: bulging bag with AROM with watery blood tinged fluid, moderate amount.  9 (lip) 90/0    1720 StC followed by SVE per pt reports of rectal pressure: 10/100/0.     1725 Pt laboring down. Hernando Nash made aware of VE    [address]45 [place] at bedside to dose epidural for c/o rectal pain/pressure. [address] Pushing with ucs. [address] on unit. Continues to pushing with ucs, tolerating well. 190 Pt up in [facility]. Hernando Nash here for delivery.   of live male baby, Hernando Nash in attendance. Loose nuchal cord reduced.   of placenta. Noted abruption site.  PP care started.  Bedside and Verbal shift change report given to [address] [facility] (oncoming nurse) by LISA Agosto RN (offgoing nurse).  Report included the following information SBAR, Procedure Summary, Accordion and Recent Results.

## 2017-12-03 NOTE — PROGRESS NOTES
0600 Report received from [facility]    1849 Bedside shift change report given to LISA Amador (oncoming nurse) by Stephen Reyna Rn (offgoing nurse). Report included the following information SBAR, MAR and Med Rec Status.

## 2017-12-03 NOTE — PROGRESS NOTES
Pt seen and examined. Report received from outgoing physician. Pt states that she is gaby and they are mild to moderate, but far apart. Continues to bleed \"like a period\", passed a walnut sized clot this morning. Good fetal movement, no LOF.      98.5,57,16,102/56  Chest: CTA  CV: S1S2 RRR  Abd: gravid, Nt ND  SVE: 2-3/50/-3  FHT:reactive, Cat I  [place]: Q6-8      A/ 21 yo  with vaginal bleeding likely from abruption. FHT reactive at this time, GBS unknown. KB pending. THC+    P/ will augment with pitocin, continuous monitoring. Will proceed to  if bleeding increases or FHT become non reassuring.

## 2017-12-03 NOTE — H&P
Labor and Delivery Admission Note  12/3/2017    Patient is a 20 y.o. Issca Merritt at 38w5d who presents reporting onset of bright red bleeding at home at approximately 9 pm.  She reports that she she noticed spotting followed by a slightly heavier amount of blood. She wore a pad to the hospital which has small amount of blood on it. She denies leaking fluid but does report an episode last week when she urinated \"and then it kept coming out after I was finished. \"  She did not have ongoing leakage so she did not think it was her water breaking. She reports irregular mild contractions this evening; she denies pain between the contractions. She reports normal movement from baby. She reports that she was last seen in the office 2 weeks ago and was 1 cm at that time. She denies recent intercourse, douching, or cervical exam.    PNC: Blood type: B            RH: pos            Hep B: neg            Rubella: immune            GBS status: not available in chart    OBHX: Term  x 1; current pregnancy complicated by maternal smoking and by anemia (patient states that she isn't taking her iron supplement)  OB History      Para Term  AB Living    2 1 1   1    SAB TAB Ectopic Molar Multiple Live Births        0 1          PMH:   Past Medical History:   Diagnosis Date    Anemia     Contact dermatitis and other eczema, due to unspecified cause        PSH:   Past Surgical History:   Procedure Laterality Date    HX OTHER SURGICAL      eye surgery as a baby    HX OTHER SURGICAL      gum surgery as a child       OB/GYN: Paulina    Meds:   Prior to Admission Medications   Prescriptions Last Dose Informant Patient Reported? Taking?   amoxicillin 500 mg tab 11/3/2017 at Unknown time  Yes Yes   Sig: Take 500 mg by mouth three (3) times daily. Indications: dental surgery   prenatal multivit-ca-min-fe-fa (PRENATAL VITAMIN) tab Not Taking at Unknown time  No No   Sig: Take 1 Tab by mouth daily.       Facility-Administered Medications: None       Allergies: No Known Allergies    Pertinent ROS: Review of Systems - Negative except as per HPI    [address]:   Family History   Problem Relation Age of Onset   Uvaldo Kimball Asthma Mother     Hypertension Mother     Hypertension Father        SH:   Social History     Social History    Marital status: SINGLE     Spouse name: N/A    Number of children: N/A    Years of education: N/A     Occupational History    Not on file. Social History Main Topics    Smoking status: Current Every Day Smoker     Packs/day: 0.25     Years: 7.00     Types: Cigarettes, Cigars    Smokeless tobacco: Never Used    Alcohol use No    Drug use: No    Sexual activity: Yes     Partners: Male     Birth control/ protection: Injection     Other Topics Concern    Not on file     Social History Narrative       OBJECTIVE:    Temp (24hrs), Av.4 °F (36.9 °C), Min:98.4 °F (36.9 °C), Max:98.4 °F (36.9 °C)    Visit Vitals    /67    Pulse 68    Temp 98.4 °F (36.9 °C)    Resp 16    Ht 5' 3\" (1.6 m)    Wt 61.2 kg (135 lb)    LMP 2017 (Within Days)    BMI 23.91 kg/m2       FHR baseline 125, moderate variability, no decels  [place] CTX q 7-10 minutes    Exam:  General: alert  HEENT:  normal   Lungs:  clear  Cor:  RRR  Abdomen:  Benign; fundus soft and non-tender between contractions  Speculum exam: small to moderate amount of dark blood in the vault; no lesions or injuries seen; dark blood noted at os, which is visually closed  Cervix:  1/50/-3  Fluid: none seen  Pelvimetry:  AP-good                      Arch- adequate                      Sidewalls- adequate                      Pelvis feels adequate for fetus. Extremities:  normal, no edema    Bedside ultrasound - vertex presentation; active fetus with + breathing motion; MENDEZ 7.9 cm      Impression:  at 38w5d with vaginal bleeding and irregular contractions. Fetal tracing currently reassuring.   Differential diagnosis includes early labor as well as marginal abruption. Discussed potential diagnoses with Ms. Franks and her partner and explained the difficulty of definitively diagnosing abruption. In light of her current symptoms I recommended prolonged observation with IV access, continuous fetal monitoring, NPO status, and labs to include CBC/T&S/Kleihauer-Betke screen. She is aware that if bleeding worsened and/or fetal heart rate became non-reassuring,  delivery might be needed. For the moment expectant management with close observation is planned. She expressed good understanding and agreed with this plan of care.     Florina Aguayo MD  2:32 AM

## 2017-12-03 NOTE — PROGRESS NOTES
Pt seen and examined. Comfortable with epidural.    97.7,71,18,104/60  SVE: 9/90/0, significant bloody show  AROM clear  FHT: reactive, Cat I  [place]: Q1-2      A/ 38 5/7 week augmentation for mild abruption. FHT stable and reactive, making good cervical change.      P/ continue pitocin, expect

## 2017-12-03 NOTE — ANESTHESIA PROCEDURE NOTES
Epidural Block    Start time: 12/3/2017 1:58 PM  End time: 12/3/2017 2:11 PM  Performed by: Constantino Hamilton  Authorized by: Constantino Hamilton     Pre-Procedure  Indication: labor epidural    Preanesthetic Checklist: patient identified, risks and benefits discussed, anesthesia consent, patient being monitored, timeout performed and anesthesia consent    Timeout Time: 13:58        Epidural:   Patient position:  Left lateral decubitus  Prep region:  Lumbar  Prep: Chlorhexidine    Location:  L2-3    Needle and Epidural Catheter:   Needle Type:  Tuohy  Needle Gauge:  17 G  Injection Technique:  Loss of resistance using air  Attempts:  1  Catheter Size:  19 G  Events: no blood with aspiration, no cerebrospinal fluid with aspiration, no paresthesia and negative aspiration test    Test Dose:  Bupivacaine 0.25% and negative    Assessment:   Catheter Secured:  Tegaderm and tape  Insertion:  Uncomplicated  Patient tolerance:  Patient tolerated the procedure well with no immediate complications

## 2017-12-03 NOTE — PROGRESS NOTES
9669 Patient arrived ambulatory from home with c/o bright red bleeding. States positive fetal movement. Denies LOF , contractions, or recent sexual intercourse. Cis.Tyesha Dr. Mireya Hernandez at bedside. Discussed POC. Spec exam. SVE 1/50  0208 Bedside U/S.  0245 IV placed, labs drawn  0555 Bedside and Verbal shift change report given to LOI James RN (oncoming nurse) by Lulú Frias RN (offgoing nurse). Report included the following information SBAR, Kardex, Procedure Summary, Intake/Output, MAR and Recent Results.

## 2017-12-04 PROCEDURE — 65410000002 HC RM PRIVATE OB

## 2017-12-04 PROCEDURE — 74011250637 HC RX REV CODE- 250/637: Performed by: OBSTETRICS & GYNECOLOGY

## 2017-12-04 RX ORDER — IBUPROFEN 400 MG/1
800 TABLET ORAL
Status: DISCONTINUED | OUTPATIENT
Start: 2017-12-04 | End: 2017-12-05 | Stop reason: HOSPADM

## 2017-12-04 RX ORDER — OXYCODONE AND ACETAMINOPHEN 5; 325 MG/1; MG/1
1 TABLET ORAL
Status: DISCONTINUED | OUTPATIENT
Start: 2017-12-04 | End: 2017-12-05 | Stop reason: HOSPADM

## 2017-12-04 RX ADMIN — ACETAMINOPHEN 650 MG: 650 SOLUTION ORAL at 09:56

## 2017-12-04 RX ADMIN — IBUPROFEN 800 MG: 200 SUSPENSION ORAL at 06:17

## 2017-12-04 RX ADMIN — OXYCODONE HYDROCHLORIDE AND ACETAMINOPHEN 1 TABLET: 5; 325 TABLET ORAL at 23:44

## 2017-12-04 RX ADMIN — IBUPROFEN 800 MG: 200 SUSPENSION ORAL at 16:25

## 2017-12-04 NOTE — L&D DELIVERY NOTE
This patient was 30 or more weeks gestation at the time of [facility] go-live. For complete information pertaining to this patient's pregnancy, please refer to the paper chart and ACOG form. Delivery Note    Obstetrician:  Anjali Arambula MD    Assistant: none    Pre-Delivery Diagnosis: Term pregnancy, vaginal bleeding , abruption    Post-Delivery Diagnosis: Living  infant, male    Intrapartum Event: Abruption    Procedure: Spontaneous vaginal delivery    Epidural: YES    Monitor:  Fetal Heart Tones - External    Indications for instrumental delivery: none    Estimated Blood Loss:  300  Episiotomy: n/a    Laceration(s):  none    Laceration(s) repair: NO    Presentation: Cephalic    Fetal Description: guerra    Fetal Position: Occiput Posterior    Birth Weight: pending    Birth Length: pending    Apgar - One Minute: 8    Apgar - Five Minutes: 9    Umbilical Cord: 3 vessels present    Specimens: none sent           Complications:  Pt had vaginal bleeding and a known +KB.   FHT reactive and stable throughout, +abruption with vaginal delivery           Cord Blood Results:   Information for the patient's :  Alberto Ashanti [[id number]]   No results found for: Crystal Hsu, Geni Rojas    Prenatal Labs:     Lab Results   Component Value Date/Time    ABO/Rh(D) B POSITIVE 2017 02:32 AM    ABO,Rh B positive 2015    HBsAg, External negative 2017    HIV, External nonreactive 2017    Rubella, External immune 2017    RPR, External non reactive 2015    Gonorrhea, External negative 2017    Chlamydia, External negative 2017    GrBStrep, External unknown 2016        Attending Attestation: I performed the procedure    Signed By:  Anjali Arambula MD     December 3, 2017

## 2017-12-04 NOTE — PROGRESS NOTES
Post-Partum Day Number 1 Progress Note    Katie Franks     Assessment: Doing well, post partum day 1    Plan:  1. Continue routine postpartum and perineal care as well as maternal education. 2. The risks and benefits of the circumcision  procedure and anesthesia including: bleeding, infection, variability of cosmetic results were discussed at length with the mother. She gives informed consent to proceed as noted and her questions are answered. Information for the patient's :  Henny Armas [[id number]]   Vaginal, Spontaneous Delivery   Patient doing well without significant complaint. Voiding without difficulty, normal lochia. Vitals:  Visit Vitals    /53 (BP 1 Location: Right arm, BP Patient Position: At rest)    Pulse 65    Temp 98.5 °F (36.9 °C)    Resp 14    Ht 5' 3\" (1.6 m)    Wt 61.2 kg (135 lb)    LMP 2017 (Within Days)    SpO2 99%    Breastfeeding Unknown    BMI 23.91 kg/m2     Temp (24hrs), Av.2 °F (36.8 °C), Min:97.7 °F (36.5 °C), Max:98.5 °F (36.9 °C)        Exam:   Patient without distress. Abdomen soft, fundus firm, nontender                Lower extremities are symmetric without tenderness, cords or erythema. Labs:     Lab Results   Component Value Date/Time    WBC 6.6 2017 02:32 AM    WBC 7.0 2016 10:25 AM    HGB 10.1 2017 02:32 AM    HGB 12.6 2016 10:25 AM    HCT 30.1 2017 02:32 AM    HCT 37.0 2016 10:25 AM    PLATELET 903  02:32 AM    PLATELET 924  10:25 AM       No results found for this or any previous visit (from the past 24 hour(s)).

## 2017-12-04 NOTE — ROUTINE PROCESS
TRANSFER - IN REPORT:    Verbal report received from LING Dueñas RN (name) on Ltanya Jeffrey  being received from L&D (unit) for routine progression of care      Report consisted of patients Situation, Background, Assessment and   Recommendations(SBAR). Information from the following report(s) SBAR, ED Summary, Procedure Summary, Intake/Output, MAR, Recent Results and Med Rec Status was reviewed with the receiving nurse. Opportunity for questions and clarification was provided. Assessment completed upon patients arrival to unit and care assumed.

## 2017-12-05 VITALS
RESPIRATION RATE: 16 BRPM | DIASTOLIC BLOOD PRESSURE: 77 MMHG | OXYGEN SATURATION: 99 % | WEIGHT: 135 LBS | HEIGHT: 63 IN | HEART RATE: 57 BPM | BODY MASS INDEX: 23.92 KG/M2 | SYSTOLIC BLOOD PRESSURE: 120 MMHG | TEMPERATURE: 97.5 F

## 2017-12-05 PROCEDURE — 74011250637 HC RX REV CODE- 250/637: Performed by: OBSTETRICS & GYNECOLOGY

## 2017-12-05 RX ORDER — IBUPROFEN 600 MG/1
600 TABLET ORAL
Qty: 40 TAB | Refills: 0 | Status: SHIPPED | OUTPATIENT
Start: 2017-12-05

## 2017-12-05 RX ORDER — OXYCODONE AND ACETAMINOPHEN 5; 325 MG/1; MG/1
1 TABLET ORAL
Qty: 10 TAB | Refills: 0 | Status: SHIPPED | OUTPATIENT
Start: 2017-12-05 | End: 2019-08-15

## 2017-12-05 RX ADMIN — OXYCODONE HYDROCHLORIDE AND ACETAMINOPHEN 1 TABLET: 5; 325 TABLET ORAL at 03:34

## 2017-12-05 RX ADMIN — OXYCODONE HYDROCHLORIDE AND ACETAMINOPHEN 1 TABLET: 5; 325 TABLET ORAL at 14:11

## 2017-12-05 NOTE — LACTATION NOTE
This note was copied from a baby's chart.     Couplet Interdisciplinary Rounds     MATERNAL    Daily Goal:     Influenza screening completed: Patient refused   Tdap screening completed: Patient refused   Rhogam Given:N/A  MMR Given:N/A    VTE Prophylaxis: Not indicated, per Provider order    EPDS:            Patient Name: NIKKO Wilder Diagnosis:   Single liveborn, born in hospital, delivered by vaginal delivery   Date of Admission: 12/3/2017 LOS: 2  Gestational Age: Gestational Age: 44w7d       Daily Goal:     Birth Weight: 2.91 kg Current Weight: Weight: 2.835 kg (6.4 lb)  % of Weight Change: -3%    Feeding:   Metabolic Screen: YES    Hepatitis B:  YES    Discharge Bili:  YES  Car Seat Trial, if needed:  N/A      Patient/Family Teaching Needs:     Days before discharge: Ready for discharge    In Attendance:  Nursing and Physician

## 2017-12-05 NOTE — PROGRESS NOTES
CM referral for education regarding positive THC drug screen. Mom understands not the best idea but wasn't aware it would hurt the baby. Mom made aware of potential future health complications. Mom made aware meconium formation begins in 2nd trimester, and positive results typically reflect exposure in the last month or longer prior to delivery. We discussed management of a baby withdrawing, and low light/stimulation and baby massage. Mom aware meconium sent to [facility] lab for testing and [place] CPS will be notified if positive. 1600 - CM contacted [employer] ([facility]) with permission from mom to provide early childhood home visitation services. [facility] works in partnership with pregnant women and families with young children to provide health education, parenting guidance and support. Application form faxed to [phone]. Referral phone# [phone]. [email or web address] Update to [facility] Nurse - Omayra Baum RN. CM will continue to follow. 100 [facility], [facility], [address] [place] [address] - [phone].    0748 - CM referral faxed to [address] [place] [address]  [phone]  [phone] for positive urine drug screen. 197 [facility] called [place] [address] [phone] to report positive THC drug screen. Meconium screen sent. Ref# [id number]    1500 - CM call to [address] [place] [address]. Gail LLAMAS. 7984 - CM referral to [facility] [phone] [facility]  [address] -                    (w) [phone]    1440 - CM referral to [facility] [address] [place] [address] 63, 30[address] [facility]. [facility] [place] - [phone]. Mom knows she will be required to coordinate pickup of all items, initially a weeks worth of diapers will be provided.  Mom understands that she is required to sign up for, attend a parenting class and bring the baby to the class to receive the remaining diapers for her baby. Mom knows that they will be notified via email when the order is ready for pickup. The order will not be ready prior to the receipt of the e-mail. Once e-mail is received, the order can be picked up between the hours of 10:00a-2:00p on Tuesdays and Thursdays. All orders must be picked up within 5-7 business days of a new referral must be submitted.

## 2017-12-05 NOTE — PROGRESS NOTES
Post-Partum Day Number 2 Progress Note    Rizwanmiguel LANDRY Golden     Assessment: Doing well, post partum day 2    Plan:   1. Discharge home today  2. Follow up in office in 6 weeks with Nay Carballo MD  3. Post partum activity advised, diet as tolerated  4. Discharge Medications: ibuprofen-patient also requested percocet because she feels the ibuprofen isn't effective. She was given 10 percocet tablets and encouraged to minimize narcotic usage  5. Circumcision today-patient consented  6. THC+ drug screen, s/p case management consult (baby pos in urine, meconium pending): plan home visit    Information for the patient's :  Jovany Mckee [[id number]]   Vaginal, Spontaneous Delivery   Patient doing well without significant complaint. Voiding without difficulty, normal lochia. Vitals:  Visit Vitals    /77 (BP 1 Location: Right arm, BP Patient Position: Sitting)    Pulse (!) 58    Temp 98 °F (36.7 °C)    Resp 16    Ht 5' 3\" (1.6 m)    Wt 61.2 kg (135 lb)    LMP 2017 (Within Days)    SpO2 99%    Breastfeeding Unknown    BMI 23.91 kg/m2     Temp (24hrs), Av °F (36.7 °C), Min:97.9 °F (36.6 °C), Max:98.1 °F (36.7 °C)      Exam:         Patient without distress. Abdomen soft, fundus firm, nontender                 Lower extremities are negative for swelling, cords or tenderness. Labs:     Lab Results   Component Value Date/Time    WBC 6.6 2017 02:32 AM    WBC 7.0 2016 10:25 AM    HGB 10.1 2017 02:32 AM    HGB 12.6 2016 10:25 AM    HCT 30.1 2017 02:32 AM    HCT 37.0 2016 10:25 AM    PLATELET 542  02:32 AM    PLATELET 618  10:25 AM       No results found for this or any previous visit (from the past 24 hour(s)).

## 2017-12-05 NOTE — DISCHARGE INSTRUCTIONS
After Your Delivery (the Postpartum Period): Care Instructions  Your Care Instructions    Congratulations on the birth of your baby. Like pregnancy, the  period can be a time of excitement, alicia, and exhaustion. You may look at your wondrous little baby and feel happy. You may also be overwhelmed by your new sleep hours and new responsibilities. At first, babies often sleep during the days and are awake at night. They do not have a pattern or routine. They may make sudden gasps, jerk themselves awake, or look like they have crossed eyes. These are all normal, and they may even make you smile. In these first weeks after delivery, try to take good care of yourself. It may take 4 to 6 weeks to feel like yourself again, and possibly longer if you had a  birth. You will likely feel very tired for several weeks. Your days will be full of ups and downs, but lots of alicia as well. Follow-up care is a key part of your treatment and safety. Be sure to make and go to all appointments, and call your doctor if you are having problems. It's also a good idea to know your test results and keep a list of the medicines you take. How can you care for yourself at home? Take care of your body after delivery  · Use pads instead of tampons for the bloody flow that may last as long as 2 weeks. · Ease cramps with ibuprofen (Advil, Motrin). · Ease soreness of hemorrhoids and the area between your vagina and rectum with ice compresses or witch hazel pads. · Ease constipation by drinking lots of fluid and eating high-fiber foods. Ask your doctor about over-the-counter stool softeners. · Cleanse yourself with a gentle squeeze of warm water from a bottle instead of wiping with toilet paper. · Take a sitz bath in warm water several times a day. · Wear a good nursing bra. Ease sore and swollen breasts with warm, wet washcloths. · If you are not breastfeeding, use ice rather than heat for breast soreness.   · Your period may not start for several months if you are breastfeeding. You may bleed more, and longer at first, than you did before you got pregnant. · Wait until you are healed (about 4 to 6 weeks) before you have sexual intercourse. Your doctor will tell you when it is okay to have sex. · Try not to travel with your baby for 5 or 6 weeks. If you take a long car trip, make frequent stops to walk around and stretch. Avoid exhaustion  · Rest every day. Try to nap when your baby naps. · Ask another adult to be with you for a few days after delivery. · Plan for  if you have other children. · Stay flexible so you can eat at odd hours and sleep when you need to. Both you and your baby are making new schedules. · Plan small trips to get out of the house. Change can make you feel less tired. · Ask for help with housework, cooking, and shopping. Remind yourself that your job is to care for your baby. Know about help for postpartum depression  · \"Baby blues\" are common for the first 1 to 2 weeks after birth. You may cry or feel sad or irritable for no reason. · Rest whenever you can. Being tired makes it harder to handle your emotions. · Go for walks with your baby. · Talk to your partner, friends, and family about your feelings. · If your symptoms last for more than a few weeks, or if you feel very depressed, ask your doctor for help. · Postpartum depression can be treated. Support groups and counseling can help. Sometimes medicine can also help. Stay healthy  · Eat healthy foods so you have more energy, make good breast milk, and lose extra baby pounds. · If you breastfeed, avoid alcohol and drugs. Stay smoke-free. If you quit during pregnancy, congratulations. · Start daily exercise after 4 to 6 weeks, but rest when you feel tired. · Learn exercises to tone your belly. Do Kegel exercises to regain strength in your pelvic muscles. You can do these exercises while you stand or sit.   ¨ Squeeze the same muscles you would use to stop your urine. Your belly and thighs should not move. ¨ Hold the squeeze for 3 seconds, and then relax for 3 seconds. ¨ Start with 3 seconds. Then add 1 second each week until you are able to squeeze for 10 seconds. ¨ Repeat the exercise 10 to 15 times for each session. Do three or more sessions each day. · Find a class for new mothers and new babies that has an exercise time. · If you had a  birth, give yourself a bit more time before you exercise, and be careful. When should you call for help? Call 911 anytime you think you may need emergency care. For example, call if:  ? · You passed out (lost consciousness). ?Call your doctor now or seek immediate medical care if:  ? · You have severe vaginal bleeding. This means you are passing blood clots and soaking through a pad each hour for 2 or more hours. ? · You are dizzy or lightheaded, or you feel like you may faint. ? · You have a fever. ? · You have new belly pain, or your pain gets worse. ? Watch closely for changes in your health, and be sure to contact your doctor if:  ? · Your vaginal bleeding seems to be getting heavier. ? · You have new or worse vaginal discharge. ? · You feel sad, anxious, or hopeless for more than a few days. ? · You do not get better as expected. Where can you learn more? Go to http://trudi-ottoniel.info/. Enter A461 in the search box to learn more about \"After Your Delivery (the Postpartum Period): Care Instructions. \"  Current as of: 2017  Content Version: 11.4  © 6328-3992 [employer]. Care instructions adapted under license by [employer] (which disclaims liability or warranty for this information). If you have questions about a medical condition or this instruction, always ask your healthcare professional. [address] any warranty or liability for your use of this information.

## 2017-12-05 NOTE — ROUTINE PROCESS
Bedside shift change report given to [address] [place] [address] (oncoming nurse) by Mark Musa RN (offgoing nurse). Report included the following information SBAR, Kardex, Intake/Output and Recent Results.

## 2017-12-05 NOTE — ROUTINE PROCESS
Bedside and Verbal shift change report given to EVA Santana RN (oncoming nurse) by LADAN Mehta RN (offgoing nurse). Report included the following information SBAR, Kardex, Procedure Summary, Intake/Output, MAR, Recent Results and Med Rec Status. 1450: Electronic pad not working. Discharge paperwork signed.

## 2017-12-05 NOTE — ROUTINE PROCESS
1600 Received OB SBAR report at bedside from Silva Moritz, RN.  7774 Called Dr. Arias Notice to request a stronger pain medicine per patient's request.  Dr. Arias Notice ordered percocet (see MAR) over the phone with readback.

## 2017-12-05 NOTE — DISCHARGE SUMMARY
Obstetrical Discharge Summary     Name: Constanza Stuart MRN: [id number]  SSN: [id number]    YOB: 1995  Age: 20 y.o. Sex: female      Admit Date: 12/3/2017    Discharge Date: 2017     Admitting Physician: Juan Cortez MD     Attending Physician:  Alexandria Amezquita MD     Admission Diagnoses: Placental abruption affecting delivery    Discharge Diagnoses:   Information for the patient's :  Nahum Hussein [[id number]]   Delivery of a 2.91 kg male infant via Vaginal, Spontaneous Delivery on 12/3/2017 at 10:10 PM  by . Apgars were 8 and 9. Additional Diagnoses:   Hospital Problems  Date Reviewed: 2014          Codes Class Noted POA    Placental abruption affecting delivery ICD-10-CM: O45.90  ICD-9-CM: 641.21  12/3/2017 Unknown             Lab Results   Component Value Date/Time    Rubella, External immune 2017    GrBStrep, External unknown 2016       Hospital Course: Patient was admitted with vaginal bleeding. Clinical picture was consistent with placental abruption with a positive KB screen. Of note, a drug screen was positive for THC. She had some mild contractions and was given pitocin for augmentation, then had an uncomplicated vaginal delivery. She had a normal hospital course following the delivery. Disposition at Discharge: Home or self care    Discharged Condition: Stable    Patient Instructions:   Current Discharge Medication List      START taking these medications    Details   ibuprofen (MOTRIN) 600 mg tablet Take 1 Tab by mouth every six (6) hours as needed for Pain. Qty: 40 Tab, Refills: 0         CONTINUE these medications which have NOT CHANGED    Details   prenatal multivit-ca-min-fe-fa (PRENATAL VITAMIN) tab Take 1 Tab by mouth daily. Qty: 30 Tab, Refills: 0         STOP taking these medications       amoxicillin 500 mg tab Comments:   Reason for Stopping:               Reference my discharge instructions.     Follow-up Appointments   Procedures  FOLLOW UP VISIT Appointment in: 6 Weeks     Standing Status:   Standing     Number of Occurrences:   1     Order Specific Question:   Appointment in     Answer:   6 Weeks        Signed By:  Reece Mello MD     December 5, 2017

## 2019-06-28 ENCOUNTER — OFFICE VISIT (OUTPATIENT)
Dept: INTERNAL MEDICINE CLINIC | Age: 24
End: 2019-06-28

## 2019-06-28 VITALS
RESPIRATION RATE: 16 BRPM | TEMPERATURE: 99.1 F | HEIGHT: 63 IN | BODY MASS INDEX: 20.38 KG/M2 | HEART RATE: 65 BPM | OXYGEN SATURATION: 100 % | DIASTOLIC BLOOD PRESSURE: 61 MMHG | WEIGHT: 115 LBS | SYSTOLIC BLOOD PRESSURE: 100 MMHG

## 2019-06-28 DIAGNOSIS — F41.9 ANXIETY: ICD-10-CM

## 2019-06-28 DIAGNOSIS — Z00.00 PHYSICAL EXAM, ANNUAL: Primary | ICD-10-CM

## 2019-06-28 DIAGNOSIS — L02.93 RECURRENT BOILS: ICD-10-CM

## 2019-06-28 DIAGNOSIS — F17.200 CURRENT SMOKER: ICD-10-CM

## 2019-06-28 DIAGNOSIS — N89.8 VAGINAL DISCHARGE: ICD-10-CM

## 2019-06-28 DIAGNOSIS — M89.8X9 BONE PAIN: ICD-10-CM

## 2019-06-28 LAB
BILIRUB UR QL STRIP: NEGATIVE
GLUCOSE UR-MCNC: NEGATIVE MG/DL
KETONES P FAST UR STRIP-MCNC: NEGATIVE MG/DL
PH UR STRIP: 6 [PH] (ref 4.6–8)
PROT UR QL STRIP: NORMAL
SP GR UR STRIP: 1.02 (ref 1–1.03)
UA UROBILINOGEN AMB POC: NORMAL (ref 0.2–1)
URINALYSIS CLARITY POC: CLEAR
URINALYSIS COLOR POC: YELLOW
URINE BLOOD POC: NORMAL
URINE LEUKOCYTES POC: NEGATIVE
URINE NITRITES POC: NEGATIVE

## 2019-06-28 NOTE — PROGRESS NOTES
Rm 9    Chief Complaint   Patient presents with   [address] Vaginal Discharge     noticed about a week ago, smelly at times per pt     1. Have you been to the ER, urgent care clinic since your last visit? Hospitalized since your last visit? Pt first , 2 months ago,     2. Have you seen or consulted any other health care providers outside of the [address] [place] [address] since your last visit? Include any pap smears or colon screening.  No    Health Maintenance Due   Topic Date Due    Pneumococcal 0-64 years (1 of 1 - PPSV23) 07/29/2000    HPV Age 9Y-34Y (1 - Female 3-dose series) 07/29/2009    DTaP/Tdap/Td series (1 - Tdap) 07/29/2015    PAP AKA CERVICAL CYTOLOGY  07/29/2015   pt needs to have pap done      3 most recent PHQ Screens 6/28/2019   Little interest or pleasure in doing things Several days   Feeling down, depressed, irritable, or hopeless Several days   Total Score PHQ 2 2       Learning Assessment 6/28/2019   PRIMARY LEARNER Patient   HIGHEST LEVEL OF EDUCATION - PRIMARY LEARNER  [employer]   BARRIERS PRIMARY LEARNER NONE   CO-LEARNER CAREGIVER No   PRIMARY LANGUAGE ENGLISH   LEARNER PREFERENCE PRIMARY DEMONSTRATION   ANSWERED BY patient   RELATIONSHIP SELF

## 2019-06-28 NOTE — PROGRESS NOTES
HISTORY OF PRESENT ILLNESS  Ximena Etienne is a 21 y.o. female presents to establish care    HPI  Had UTI 1 month ago. Symptoms resolved    Now has abnormal vaginal discharge with out odor or  itching    Bones, throbbing, painful    Legs give out    Poor diet due to busy lifestyle     Poor sleep~ 4-5 hours     Chronic migraines, OTC medication s effective     Menses regular,  Last 2 weeks ago    No contraceptive. Not currently sexually active    Was on DMPA from 2016 -2018. 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  ibuprofen (MOTRIN) 600 mg tablet Take 1 Tab by mouth every six (6) hours as needed for Pain. No current facility-administered medications for this visit. Review of Systems   Constitutional: Negative for chills, fever and weight loss. Eyes: Negative. Respiratory: Negative. Cardiovascular: Negative. Gastrointestinal: Negative. Genitourinary: Negative. Musculoskeletal: Positive for joint pain and myalgias. Neurological: Negative. Psychiatric/Behavioral: The patient is nervous/anxious. Visit Vitals  /61 (BP 1 Location: Left arm, BP Patient Position: Sitting)   Pulse 65   Temp 99.1 °F (37.3 °C) (Oral)   Resp 16   Ht 5' 3\" (1.6 m)   Wt 115 lb (52.2 kg)   SpO2 100%   BMI 20.37 kg/m²     Physical Exam   Constitutional: She is oriented to person, place, and time. She appears well-developed and well-nourished. No distress.    Eyes: Conjunctivae are normal. Right eye exhibits no discharge. Left eye exhibits no discharge. Neck: Normal range of motion. Neck supple. No thyromegaly present. Cardiovascular: Normal rate and regular rhythm. Pulmonary/Chest: Effort normal and breath sounds normal.   Abdominal: Soft. Bowel sounds are normal. She exhibits no distension and no mass. There is no tenderness. There is no rebound. Musculoskeletal: She exhibits no edema, tenderness or deformity. Neurological: She is alert and oriented to person, place, and time. Skin: Skin is warm and dry. She is not diaphoretic. No new skin lesion, scarring on legs   Psychiatric: She has a normal mood and affect. Her behavior is normal. Judgment and thought content normal.       ASSESSMENT and PLAN    ICD-10-CM ICD-9-CM    1. Physical exam, annual M01.90 G16.5 METABOLIC PANEL, COMPREHENSIVE      LIPID PANEL      AMB POC URINALYSIS DIP STICK AUTO W/O MICRO   2. Vaginal discharge N89.8 623.5 NUSWAB VAGINITIS PLUS   3. Anxiety F41.9 300.00    4. Current smoker F17.200 305.1    5. Recurrent boils S71.11 973.4 METABOLIC PANEL, COMPREHENSIVE      CBC WITH AUTOMATED DIFF   6. Bone pain M89.8X9 733.90 TSH AND FREE T4      VITAMIN D, 25 HYDROXY      CBC WITH AUTOMATED DIFF      SED RATE (ESR)     Follow-up and Dispositions    · Return in about 3 months (around 9/28/2019) for tobacco use. Nuswab self administered    Encouraged NSAIDs prn pain,balanced diet, regular physical activity, adequate rest and hydration    reviewed diet, exercise and weight control  very strongly urged to quit smoking to reduce cardiovascular risk  reviewed medications and side effects in detail      Patient voices understanding and acceptance of this advice and will call back if any further questions or concerns. An After Visit Summary was printed and given to the patient    . Keegan Velasco

## 2019-06-28 NOTE — PATIENT INSTRUCTIONS
Anxiety Disorder: Care Instructions  Your Care Instructions    Anxiety is a normal reaction to stress. Difficult situations can cause you to have symptoms such as sweaty palms and a nervous feeling. In an anxiety disorder, the symptoms are far more severe. Constant worry, muscle tension, trouble sleeping, nausea and diarrhea, and other symptoms can make normal daily activities difficult or impossible. These symptoms may occur for no reason, and they can affect your work, school, or social life. Medicines, counseling, and self-care can all help. Follow-up care is a key part of your treatment and safety. Be sure to make and go to all appointments, and call your doctor if you are having problems. It's also a good idea to know your test results and keep a list of the medicines you take. How can you care for yourself at home? · Take medicines exactly as directed. Call your doctor if you think you are having a problem with your medicine. · Go to your counseling sessions and follow-up appointments. · Recognize and accept your anxiety. Then, when you are in a situation that makes you anxious, say to yourself, \"This is not an emergency. I feel uncomfortable, but I am not in danger. I can keep going even if I feel anxious. \"  · Be kind to your body:  ? Relieve tension with exercise or a massage. ? Get enough rest.  ? Avoid alcohol, caffeine, nicotine, and illegal drugs. They can increase your anxiety level and cause sleep problems. ? Learn and do relaxation techniques. See below for more about these techniques. · Engage your mind. Get out and do something you enjoy. Go to a funny movie, or take a walk or hike. Plan your day. Having too much or too little to do can make you anxious. · Keep a record of your symptoms. Discuss your fears with a good friend or family member, or join a support group for people with similar problems. Talking to others sometimes relieves stress.   · Get involved in social groups, or volunteer to help others. Being alone sometimes makes things seem worse than they are. · Get at least 30 minutes of exercise on most days of the week to relieve stress. Walking is a good choice. You also may want to do other activities, such as running, swimming, cycling, or playing tennis or team sports. Relaxation techniques  Do relaxation exercises 10 to 20 minutes a day. You can play soothing, relaxing music while you do them, if you wish. · Tell others in your house that you are going to do your relaxation exercises. Ask them not to disturb you. · Find a comfortable place, away from all distractions and noise. · Lie down on your back, or sit with your back straight. · Focus on your breathing. Make it slow and steady. · Breathe in through your nose. Breathe out through either your nose or mouth. · Breathe deeply, filling up the area between your navel and your rib cage. Breathe so that your belly goes up and down. · Do not hold your breath. · Breathe like this for 5 to 10 minutes. Notice the feeling of calmness throughout your whole body. As you continue to breathe slowly and deeply, relax by doing the following for another 5 to 10 minutes:  · Tighten and relax each muscle group in your body. You can begin at your toes and work your way up to your head. · Imagine your muscle groups relaxing and becoming heavy. · Empty your mind of all thoughts. · Let yourself relax more and more deeply. · Become aware of the state of calmness that surrounds you. · When your relaxation time is over, you can bring yourself back to alertness by moving your fingers and toes and then your hands and feet and then stretching and moving your entire body. Sometimes people fall asleep during relaxation, but they usually wake up shortly afterward. · Always give yourself time to return to full alertness before you drive a car or do anything that might cause an accident if you are not fully alert.  Never play a relaxation tape while you drive a car. When should you call for help? Call 911 anytime you think you may need emergency care. For example, call if:    · You feel you cannot stop from hurting yourself or someone else.   Duana Sandifer the numbers for these national suicide hotlines: 2-514-593-TALK (5-706-505-256.502.5948) and 9-754-RVZNQYH (8-408.248.6569). If you or someone you know talks about suicide or feeling hopeless, get help right away.   Watch closely for changes in your health, and be sure to contact your doctor if:    · You have anxiety or fear that affects your life.     · You have symptoms of anxiety that are new or different from those you had before. Where can you learn more? Go to http://trudi-ottoniel.info/. Enter P754 in the search box to learn more about \"Anxiety Disorder: Care Instructions. \"  Current as of: September 11, 2018  Content Version: 11.9  © 1004-7678 [employer]. Care instructions adapted under license by [employer] (which disclaims liability or warranty for this information). If you have questions about a medical condition or this instruction, always ask your healthcare professional. [place] [address] any warranty or liability for your use of this information. Well Visit, Ages 25 to 48: Care Instructions  Your Care Instructions    Physical exams can help you stay healthy. Your doctor has checked your overall health and may have suggested ways to take good care of yourself. He or she also may have recommended tests. At home, you can help prevent illness with healthy eating, regular exercise, and other steps. Follow-up care is a key part of your treatment and safety. Be sure to make and go to all appointments, and call your doctor if you are having problems. It's also a good idea to know your test results and keep a list of the medicines you take. How can you care for yourself at home? · Reach and stay at a healthy weight.  This will lower your risk for many problems, such as obesity, diabetes, heart disease, and high blood pressure. · Get at least 30 minutes of physical activity on most days of the week. Walking is a good choice. You also may want to do other activities, such as running, swimming, cycling, or playing tennis or team sports. Discuss any changes in your exercise program with your doctor. · Do not smoke or allow others to smoke around you. If you need help quitting, talk to your doctor about stop-smoking programs and medicines. These can increase your chances of quitting for good. · Talk to your doctor about whether you have any risk factors for sexually transmitted infections (STIs). Having one sex partner (who does not have STIs and does not have sex with anyone else) is a good way to avoid these infections. · Use birth control if you do not want to have children at this time. Talk with your doctor about the choices available and what might be best for you. · Protect your skin from too much sun. When you're outdoors from 10 a.m. to 4 p.m., stay in the shade or cover up with clothing and a hat with a wide brim. Wear sunglasses that block UV rays. Even when it's cloudy, put broad-spectrum sunscreen (SPF 30 or higher) on any exposed skin. · See a dentist one or two times a year for checkups and to have your teeth cleaned. · Wear a seat belt in the car. · Drink alcohol in moderation, if at all. That means no more than 2 drinks a day for men and 1 drink a day for women. Follow your doctor's advice about when to have certain tests. These tests can spot problems early. For everyone  · Cholesterol. Have the fat (cholesterol) in your blood tested after age 21. Your doctor will tell you how often to have this done based on your age, family history, or other things that can increase your risk for heart disease. · Blood pressure. Have your blood pressure checked during a routine doctor visit.  Your doctor will tell you how often to check your blood pressure based on your age, your blood pressure results, and other factors. · Vision. Talk with your doctor about how often to have a glaucoma test.  · Diabetes. Ask your doctor whether you should have tests for diabetes. · Colon cancer. Have a test for colon cancer at age 48. You may have one of several tests. If you are younger than 48, you may need a test earlier if you have any risk factors. Risk factors include whether you already had a precancerous polyp removed from your colon or whether your parent, brother, sister, or child has had colon cancer. For women  · Breast exam and mammogram. Talk to your doctor about when you should have a clinical breast exam and a mammogram. Medical experts differ on whether and how often women under 50 should have these tests. Your doctor can help you decide what is right for you. · Pap test and pelvic exam. Begin Pap tests at age 24. A Pap test is the best way to find cervical cancer. The test often is part of a pelvic exam. Ask how often to have this test.  · Tests for sexually transmitted infections (STIs). Ask whether you should have tests for STIs. You may be at risk if you have sex with more than one person, especially if your partners do not wear condoms. For men  · Tests for sexually transmitted infections (STIs). Ask whether you should have tests for STIs. You may be at risk if you have sex with more than one person, especially if you do not wear a condom. · Testicular cancer exam. Ask your doctor whether you should check your testicles regularly. · Prostate exam. Talk to your doctor about whether you should have a blood test (called a PSA test) for prostate cancer. Experts differ on whether and when men should have this test. Some experts suggest it if you are older than 39 and are -American or have a father or brother who got prostate cancer when he was younger than 72. When should you call for help?   Watch closely for changes in your health, and be sure to contact your doctor if you have any problems or symptoms that concern you. Where can you learn more? Go to http://trudi-ottoniel.info/. Enter P072 in the search box to learn more about \"Well Visit, Ages 25 to 48: Care Instructions. \"  Current as of: March 28, 2018  Content Version: 11.9  © 2027-3704 [employer]. Care instructions adapted under license by [employer] (which disclaims liability or warranty for this information). If you have questions about a medical condition or this instruction, always ask your healthcare professional. [place] [address] any warranty or liability for your use of this information. Stopping Smoking: Care Instructions  Your Care Instructions  Cigarette smokers crave the nicotine in cigarettes. Giving it up is much harder than simply changing a habit. Your body has to stop craving the nicotine. It is hard to quit, but you can do it. There are many tools that people use to quit smoking. You may find that combining tools works best for you. There are several steps to quitting. First you get ready to quit. Then you get support to help you. After that, you learn new skills and behaviors to become a nonsmoker. For many people, a necessary step is getting and using medicine. Your doctor will help you set up the plan that best meets your needs. You may want to attend a smoking cessation program to help you quit smoking. When you choose a program, look for one that has proven success. Ask your doctor for ideas. You will greatly increase your chances of success if you take medicine as well as get counseling or join a cessation program.  Some of the changes you feel when you first quit tobacco are uncomfortable. Your body will miss the nicotine at first, and you may feel short-tempered and grumpy. You may have trouble sleeping or concentrating. Medicine can help you deal with these symptoms.  You may struggle with changing your smoking habits and rituals. The last step is the tricky one: Be prepared for the smoking urge to continue for a time. This is a lot to deal with, but keep at it. You will feel better. Follow-up care is a key part of your treatment and safety. Be sure to make and go to all appointments, and call your doctor if you are having problems. It's also a good idea to know your test results and keep a list of the medicines you take. How can you care for yourself at home? · Ask your family, friends, and coworkers for support. You have a better chance of quitting if you have help and support. · Join a support group, such as Nicotine Anonymous, for people who are trying to quit smoking. · Consider signing up for a smoking cessation program, such as the American Lung Association's Freedom from Smoking program.  · Get text messaging support. Go to the website at www.smokefree. gov to sign up for the [facility] program.  · Set a quit date. Pick your date carefully so that it is not right in the middle of a big deadline or stressful time. Once you quit, do not even take a puff. Get rid of all ashtrays and lighters after your last cigarette. Clean your house and your clothes so that they do not smell of smoke. · Learn how to be a nonsmoker. Think about ways you can avoid those things that make you reach for a cigarette. ? Avoid situations that put you at greatest risk for smoking. For some people, it is hard to have a drink with friends without smoking. For others, they might skip a coffee break with coworkers who smoke. ? Change your daily routine. Take a different route to work or eat a meal in a different place. · Cut down on stress. Calm yourself or release tension by doing an activity you enjoy, such as reading a book, taking a hot bath, or gardening. · Talk to your doctor or pharmacist about nicotine replacement therapy, which replaces the nicotine in your body.  You still get nicotine but you do not use tobacco. Nicotine replacement products help you slowly reduce the amount of nicotine you need. These products come in several forms, many of them available over-the-counter:  ? Nicotine patches  ? Nicotine gum and lozenges  ? Nicotine inhaler  · Ask your doctor about bupropion (Wellbutrin) or varenicline (Chantix), which are prescription medicines. They do not contain nicotine. They help you by reducing withdrawal symptoms, such as stress and anxiety. · Some people find hypnosis, acupuncture, and massage helpful for ending the smoking habit. · Eat a healthy diet and get regular exercise. Having healthy habits will help your body move past its craving for nicotine. · Be prepared to keep trying. Most people are not successful the first few times they try to quit. Do not get mad at yourself if you smoke again. Make a list of things you learned and think about when you want to try again, such as next week, next month, or next year. Where can you learn more? Go to http://trudi-ottoniel.info/. Enter K387 in the search box to learn more about \"Stopping Smoking: Care Instructions. \"  Current as of: September 26, 2018  Content Version: 11.9  © 5140-6093 [employer]. Care instructions adapted under license by [employer] (which disclaims liability or warranty for this information). If you have questions about a medical condition or this instruction, always ask your healthcare professional. [place] [address] any warranty or liability for your use of this information. Learning About Benefits From Quitting Smoking  How does quitting smoking make you healthier? If you're thinking about quitting smoking, you may have a few reasons to be smoke-free. Your health may be one of them. · When you quit smoking, you lower your risks for cancer, lung disease, heart attack, stroke, blood vessel disease, and blindness from macular degeneration.   · When you're smoke-free, you get sick less often, and you heal faster. You are less likely to get colds, flu, bronchitis, and pneumonia. · As a nonsmoker, you may find that your mood is better and you are less stressed. When and how will you feel healthier? Quitting has real health benefits that start from day 1 of being smoke-free. And the longer you stay smoke-free, the healthier you get and the better you feel. The first hours  · After just 20 minutes, your blood pressure and heart rate go down. That means there's less stress on your heart and blood vessels. · Within 12 hours, the level of carbon monoxide in your blood drops back to normal. That makes room for more oxygen. With more oxygen in your body, you may notice that you have more energy than when you smoked. After 2 weeks  · Your lungs start to work better. · Your risk of heart attack starts to drop. After 1 month  · When your lungs are clear, you cough less and breathe deeper, so it's easier to be active. · Your sense of taste and smell return. That means you can enjoy food more than you have since you started smoking. Over the years  · After 1 year, your risk of heart disease is half what it would be if you kept smoking. · After 5 years, your risk of stroke starts to shrink. Within a few years after that, it's about the same as if you'd never smoked. · After 10 years, your risk of dying from lung cancer is cut by about half. And your risk for many other types of cancer is lower too. How would quitting help others in your life? When you quit smoking, you improve the health of everyone who now breathes in your smoke. · Their heart, lung, and cancer risks drop, much like yours. · They are sick less. For babies and small children, living smoke-free means they're less likely to have ear infections, pneumonia, and bronchitis. · If you're a woman who is or will be pregnant someday, quitting smoking means a healthier .   · Children who are close to you are less likely to become adult smokers. Where can you learn more? Go to http://trudi-ottoniel.info/. Enter 052 [id number] [id number] in the search box to learn more about \"Learning About Benefits From Quitting Smoking. \"  Current as of: September 26, 2018  Content Version: 11.9  © 5538-6353 [employer]. Care instructions adapted under license by [employer] (which disclaims liability or warranty for this information). If you have questions about a medical condition or this instruction, always ask your healthcare professional. [place] [address] any warranty or liability for your use of this information. Eating Healthy Foods: Care Instructions  Your Care Instructions    Eating healthy foods can help lower your risk for disease. Healthy food gives you energy and keeps your heart strong, your brain active, your muscles working, and your bones strong. A healthy diet includes a variety of foods from the basic food groups: grains, vegetables, fruits, milk and milk products, and meat and beans. Some people may eat more of their favorite foods from only one food group and, as a result, miss getting the nutrients they need. So, it is important to pay attention not only to what you eat but also to what you are missing from your diet. You can eat a healthy, balanced diet by making a few small changes. Follow-up care is a key part of your treatment and safety. Be sure to make and go to all appointments, and call your doctor if you are having problems. It's also a good idea to know your test results and keep a list of the medicines you take. How can you care for yourself at home? Look at what you eat  · Keep a food diary for a week or two and record everything you eat or drink. Track the number of servings you eat from each food group.   · For a balanced diet every day, eat a variety of:  ? 6 or more ounce-equivalents of grains, such as cereals, breads, crackers, rice, or pasta, every day. An ounce-equivalent is 1 slice of bread, 1 cup of ready-to-eat cereal, or ½ cup of cooked rice, cooked pasta, or cooked cereal.  ? 2½ cups of vegetables, especially:  § Dark-green vegetables such as broccoli and spinach. § Orange vegetables such as carrots and sweet potatoes. § Dry beans (such as nagy and kidney beans) and peas (such as lentils). ? 2 cups of fresh, frozen, or canned fruit. A small apple or 1 banana or orange equals 1 cup. ? 3 cups of nonfat or low-fat milk, yogurt, or other milk products. ? 5½ ounces of meat and beans, such as chicken, fish, lean meat, beans, nuts, and seeds. One egg, 1 tablespoon of peanut butter, ½ ounce nuts or seeds, or ¼ cup of cooked beans equals 1 ounce of meat. · Learn how to read food labels for serving sizes and ingredients. Fast-food and convenience-food meals often contain few or no fruits or vegetables. Make sure you eat some fruits and vegetables to make the meal more nutritious. · Look at your food diary. For each food group, add up what you have eaten and then divide the total by the number of days. This will give you an idea of how much you are eating from each food group. See if you can find some ways to change your diet to make it more healthy. Start small  · Do not try to make dramatic changes to your diet all at once. You might feel that you are missing out on your favorite foods and then be more likely to fail. · Start slowly, and gradually change your habits. Try some of the following:  ? Use whole wheat bread instead of white bread. ? Use nonfat or low-fat milk instead of whole milk. ? Eat brown rice instead of white rice, and eat whole wheat pasta instead of white-flour pasta. ? Try low-fat cheeses and low-fat yogurt. ? Add more fruits and vegetables to meals and have them for snacks. ? Add lettuce, tomato, cucumber, and onion to sandwiches.   ? Add fruit to yogurt and cereal.  Enjoy food  · You can still eat your favorite foods. You just may need to eat less of them. If your favorite foods are high in fat, salt, and sugar, limit how often you eat them, but do not cut them out entirely. · Eat a wide variety of foods. Make healthy choices when eating out  · The type of restaurant you choose can help you make healthy choices. Even fast-food chains are now offering more low-fat or healthier choices on the menu. · Choose smaller portions, or take half of your meal home. · When eating out, try:  ? A veggie pizza with a whole wheat crust or grilled chicken (instead of sausage or pepperoni). ? Pasta with roasted vegetables, grilled chicken, or marinara sauce instead of cream sauce. ? A vegetable wrap or grilled chicken wrap. ? Broiled or poached food instead of fried or breaded items. Make healthy choices easy  · Buy packaged, prewashed, ready-to-eat fresh vegetables and fruits, such as baby carrots, salad mixes, and chopped or shredded broccoli and cauliflower. · Buy packaged, presliced fruits, such as melon or pineapple. · Choose 100% fruit or vegetable juice instead of soda. Limit juice intake to 4 to 6 oz (½ to ¾ cup) a day. · Blend low-fat yogurt, fruit juice, and canned or frozen fruit to make a smoothie for breakfast or a snack. Where can you learn more? Go to http://trudi-ottoniel.info/. Enter T756 in the search box to learn more about \"Eating Healthy Foods: Care Instructions. \"  Current as of: March 28, 2018  Content Version: 11.9  © 4637-8948 [employer]. Care instructions adapted under license by [employer] (which disclaims liability or warranty for this information). If you have questions about a medical condition or this instruction, always ask your healthcare professional. [place] [address] any warranty or liability for your use of this information.

## 2019-06-29 LAB
25(OH)D3+25(OH)D2 SERPL-MCNC: 7.8 NG/ML (ref 30–100)
ALBUMIN SERPL-MCNC: 4.2 G/DL (ref 3.5–5.5)
ALBUMIN/GLOB SERPL: 1.7 {RATIO} (ref 1.2–2.2)
ALP SERPL-CCNC: 37 IU/L (ref 39–117)
ALT SERPL-CCNC: 7 IU/L (ref 0–32)
AST SERPL-CCNC: 14 IU/L (ref 0–40)
BASOPHILS # BLD AUTO: 0 X10E3/UL (ref 0–0.2)
BASOPHILS NFR BLD AUTO: 0 %
BILIRUB SERPL-MCNC: 1.5 MG/DL (ref 0–1.2)
BUN SERPL-MCNC: 7 MG/DL (ref 6–20)
BUN/CREAT SERPL: 10 (ref 9–23)
CALCIUM SERPL-MCNC: 9.2 MG/DL (ref 8.7–10.2)
CHLORIDE SERPL-SCNC: 105 MMOL/L (ref 96–106)
CHOLEST SERPL-MCNC: 143 MG/DL (ref 100–199)
CO2 SERPL-SCNC: 23 MMOL/L (ref 20–29)
CREAT SERPL-MCNC: 0.71 MG/DL (ref 0.57–1)
EOSINOPHIL # BLD AUTO: 0.2 X10E3/UL (ref 0–0.4)
EOSINOPHIL NFR BLD AUTO: 3 %
ERYTHROCYTE [DISTWIDTH] IN BLOOD BY AUTOMATED COUNT: 13.1 % (ref 12.3–15.4)
ERYTHROCYTE [SEDIMENTATION RATE] IN BLOOD BY WESTERGREN METHOD: 2 MM/HR (ref 0–32)
GLOBULIN SER CALC-MCNC: 2.5 G/DL (ref 1.5–4.5)
GLUCOSE SERPL-MCNC: 77 MG/DL (ref 65–99)
HCT VFR BLD AUTO: 38.7 % (ref 34–46.6)
HDLC SERPL-MCNC: 49 MG/DL
HGB BLD-MCNC: 13.5 G/DL (ref 11.1–15.9)
IMM GRANULOCYTES # BLD AUTO: 0 X10E3/UL (ref 0–0.1)
IMM GRANULOCYTES NFR BLD AUTO: 0 %
LDLC SERPL CALC-MCNC: 75 MG/DL (ref 0–99)
LYMPHOCYTES # BLD AUTO: 2.2 X10E3/UL (ref 0.7–3.1)
LYMPHOCYTES NFR BLD AUTO: 41 %
MCH RBC QN AUTO: 32.7 PG (ref 26.6–33)
MCHC RBC AUTO-ENTMCNC: 34.9 G/DL (ref 31.5–35.7)
MCV RBC AUTO: 94 FL (ref 79–97)
MONOCYTES # BLD AUTO: 0.3 X10E3/UL (ref 0.1–0.9)
MONOCYTES NFR BLD AUTO: 6 %
NEUTROPHILS # BLD AUTO: 2.6 X10E3/UL (ref 1.4–7)
NEUTROPHILS NFR BLD AUTO: 50 %
PLATELET # BLD AUTO: 232 X10E3/UL (ref 150–450)
POTASSIUM SERPL-SCNC: 4.1 MMOL/L (ref 3.5–5.2)
PROT SERPL-MCNC: 6.7 G/DL (ref 6–8.5)
RBC # BLD AUTO: 4.13 X10E6/UL (ref 3.77–5.28)
SODIUM SERPL-SCNC: 140 MMOL/L (ref 134–144)
T4 FREE SERPL-MCNC: 1.28 NG/DL (ref 0.82–1.77)
TRIGL SERPL-MCNC: 97 MG/DL (ref 0–149)
TSH SERPL DL<=0.005 MIU/L-ACNC: 0.72 UIU/ML (ref 0.45–4.5)
VLDLC SERPL CALC-MCNC: 19 MG/DL (ref 5–40)
WBC # BLD AUTO: 5.3 X10E3/UL (ref 3.4–10.8)

## 2019-07-01 ENCOUNTER — TELEPHONE (OUTPATIENT)
Dept: INTERNAL MEDICINE CLINIC | Age: 24
End: 2019-07-01

## 2019-07-01 DIAGNOSIS — E55.9 VITAMIN D DEFICIENCY: Primary | ICD-10-CM

## 2019-07-01 RX ORDER — ERGOCALCIFEROL 1.25 MG/1
50000 CAPSULE ORAL
Qty: 4 CAP | Refills: 3 | Status: SHIPPED | OUTPATIENT
Start: 2019-07-01

## 2019-07-03 NOTE — TELEPHONE ENCOUNTER
\"Please advise vitamin d is low. Rx sent to pharmacy. Other labs are fine\"   - Per Ms Radha Kaur.      Ki Henson MD

## 2019-07-03 NOTE — TELEPHONE ENCOUNTER
----- Message from Josh Garland sent at 7/3/2019 11:44 AM EDT -----  Regarding: Josie Castaneda/telephone  General Message  Caller's first and last name:  Loan Mode    Reason for call:  Lab results and discuss recent visit    Callback required yes/no and why:    Yes lab results    Best contact number(s):  [phone]    Details to clarify the request:  Guy Baeza

## 2019-07-04 LAB
A VAGINAE DNA VAG QL NAA+PROBE: ABNORMAL SCORE
BVAB2 DNA VAG QL NAA+PROBE: ABNORMAL SCORE
C ALBICANS DNA VAG QL NAA+PROBE: NEGATIVE
C GLABRATA DNA VAG QL NAA+PROBE: NEGATIVE
C TRACH RRNA SPEC QL NAA+PROBE: NEGATIVE
MEGA1 DNA VAG QL NAA+PROBE: ABNORMAL SCORE
N GONORRHOEA RRNA SPEC QL NAA+PROBE: NEGATIVE
T VAGINALIS RRNA SPEC QL NAA+PROBE: NEGATIVE

## 2019-07-05 NOTE — TELEPHONE ENCOUNTER
I talked to patient and notified her of all results per Dr. Jessie Raya. Patient asked the value of her vitamin D level and notified her of that result and RX sent to pharmacy. Patient verbalized her understanding and had no other questions at this time.

## 2019-08-15 ENCOUNTER — TELEPHONE (OUTPATIENT)
Dept: INTERNAL MEDICINE CLINIC | Age: 24
End: 2019-08-15

## 2019-08-15 RX ORDER — METRONIDAZOLE 500 MG/1
500 TABLET ORAL 2 TIMES DAILY
Qty: 14 TAB | Refills: 0 | Status: SHIPPED | OUTPATIENT
Start: 2019-08-15 | End: 2019-08-22

## 2019-08-15 NOTE — TELEPHONE ENCOUNTER
Pt state's that she is returning the nurse's call,writer informed pt that IAN Clark had sent in a prescription to the pt's pharmacy. Pt however would still deuce a callback to go over her result's pt's # [phone].

## 2019-08-15 NOTE — TELEPHONE ENCOUNTER
Inform pt of lab results and new medication sent to the pharmacy. Understanding voiced by pt. No further actions required at this time.

## 2019-12-24 ENCOUNTER — HOSPITAL ENCOUNTER (EMERGENCY)
Age: 24
Discharge: HOME | End: 2019-12-24
Attending: EMERGENCY MEDICINE
Payer: MEDICAID

## 2019-12-24 VITALS
WEIGHT: 118.5 LBS | OXYGEN SATURATION: 97 % | DIASTOLIC BLOOD PRESSURE: 55 MMHG | HEART RATE: 85 BPM | TEMPERATURE: 97.9 F | BODY MASS INDEX: 21 KG/M2 | RESPIRATION RATE: 16 BRPM | HEIGHT: 63 IN | SYSTOLIC BLOOD PRESSURE: 94 MMHG

## 2019-12-24 DIAGNOSIS — N89.8 VAGINAL DISCHARGE: ICD-10-CM

## 2019-12-24 DIAGNOSIS — R30.0 DYSURIA: ICD-10-CM

## 2019-12-24 DIAGNOSIS — Z20.2 POSSIBLE EXPOSURE TO STD: Primary | ICD-10-CM

## 2019-12-24 LAB
APPEARANCE UR: ABNORMAL
BACTERIA URNS QL MICRO: NEGATIVE /HPF
BILIRUB UR QL: NEGATIVE
CLUE CELLS VAG QL WET PREP: NORMAL
COLOR UR: ABNORMAL
EPITH CASTS URNS QL MICRO: ABNORMAL /LPF
GLUCOSE UR STRIP.AUTO-MCNC: NEGATIVE MG/DL
HGB UR QL STRIP: NEGATIVE
KETONES UR QL STRIP.AUTO: NEGATIVE MG/DL
KOH PREP SPEC: NORMAL
LEUKOCYTE ESTERASE UR QL STRIP.AUTO: ABNORMAL
NITRITE UR QL STRIP.AUTO: NEGATIVE
PH UR STRIP: 8 [PH] (ref 5–8)
PROT UR STRIP-MCNC: ABNORMAL MG/DL
RBC #/AREA URNS HPF: ABNORMAL /HPF (ref 0–5)
SERVICE CMNT-IMP: NORMAL
SP GR UR REFRACTOMETRY: 1.03 (ref 1–1.03)
T VAGINALIS VAG QL WET PREP: NORMAL
UA: UC IF INDICATED,UAUC: ABNORMAL
UROBILINOGEN UR QL STRIP.AUTO: 1 EU/DL (ref 0.2–1)
WBC URNS QL MICRO: ABNORMAL /HPF (ref 0–4)

## 2019-12-24 PROCEDURE — 87210 SMEAR WET MOUNT SALINE/INK: CPT

## 2019-12-24 PROCEDURE — 96372 THER/PROPH/DIAG INJ SC/IM: CPT

## 2019-12-24 PROCEDURE — 74011250637 HC RX REV CODE- 250/637: Performed by: EMERGENCY MEDICINE

## 2019-12-24 PROCEDURE — 87491 CHLMYD TRACH DNA AMP PROBE: CPT

## 2019-12-24 PROCEDURE — 74011000250 HC RX REV CODE- 250: Performed by: EMERGENCY MEDICINE

## 2019-12-24 PROCEDURE — 74011250636 HC RX REV CODE- 250/636: Performed by: EMERGENCY MEDICINE

## 2019-12-24 PROCEDURE — 81001 URINALYSIS AUTO W/SCOPE: CPT

## 2019-12-24 PROCEDURE — 99283 EMERGENCY DEPT VISIT LOW MDM: CPT

## 2019-12-24 RX ORDER — AZITHROMYCIN 500 MG/1
1000 TABLET, FILM COATED ORAL
Status: COMPLETED | OUTPATIENT
Start: 2019-12-24 | End: 2019-12-24

## 2019-12-24 RX ADMIN — LIDOCAINE HYDROCHLORIDE 250 MG: 10 INJECTION, SOLUTION EPIDURAL; INFILTRATION; INTRACAUDAL; PERINEURAL at 14:03

## 2019-12-24 RX ADMIN — AZITHROMYCIN MONOHYDRATE 1000 MG: 500 TABLET ORAL at 14:03

## 2019-12-24 NOTE — ED NOTES
Patient presents to the ED with c/o dysuria persisting for several days. Denies vaginal discharge or irritation. Denies n/v/d.    Pleasant and social.

## 2019-12-24 NOTE — ED NOTES
Emergency Department Nursing Plan of Care       The Nursing Plan of Care is developed from the Nursing assessment and Emergency Department Attending provider initial evaluation. The plan of care may be reviewed in the ED Provider note.     The Plan of Care was developed with the following considerations:   Patient / Family readiness to learn indicated by:verbalized understanding  Persons(s) to be included in education: patient  Barriers to Learning/Limitations:No    Signed     Cora Mcintosh RN    12/24/2019   1:55 PM

## 2019-12-24 NOTE — ED PROVIDER NOTES
EMERGENCY DEPARTMENT HISTORY AND PHYSICAL EXAM      Date: 12/24/2019  Patient Name: Kwasi Villafana    History of Presenting Illness     Chief Complaint   Patient presents with    Urinary Frequency     History Provided By: Patient    HPI: Kwasi Villafana, 21 y.o. female with past medical history significant for anemia and migraines who presents via private vehicle to the ED with cc of urinary frequency, dysuria, right flank pain, and vaginal discharge. Patient states her symptoms started approximately 1 to 2 weeks ago. She tells me she has 2 sexual partners and intermittently uses condoms with both of them. She denies any nausea, vomiting, or diarrhea. She denies take anything for the symptoms. Nothing makes the pain better or worse. Her flank pain is described as a dull and aching pain that does not radiate. PMHx: Anemia and migraines  Social Hx: Smokes 2 Black and milds per day, occasional alcohol use, frequent marijuana use    PCP: None    There are no other complaints, changes, or physical findings at this time. No current facility-administered medications on file prior to encounter. Current Outpatient Medications on File Prior to Encounter   Medication Sig Dispense Refill    ergocalciferol (ERGOCALCIFEROL) 50,000 unit capsule Take 1 Cap by mouth every seven (7) days. 4 Cap 3    ibuprofen (MOTRIN) 600 mg tablet Take 1 Tab by mouth every six (6) hours as needed for Pain.  36 Tab 0     Past History     Past Medical History:  Past Medical History:   Diagnosis Date    Anemia     Contact dermatitis and other eczema, due to unspecified cause     Migraines      Past Surgical History:  Past Surgical History:   Procedure Laterality Date    HX OTHER SURGICAL      eye surgery as a baby    HX OTHER SURGICAL      gum surgery as a child     Family History:  Family History   Problem Relation Age of Onset   [address] [facility] Ast[facility] Mother     Hypertension Mother     Hypertension Father      Social History:  Social History     Tobacco Use    Smoking status: Current Every Day Smoker     Packs/day: 0.25     Years: 7.00     Pack years: 1.75     Types: Cigarettes, Cigars    Smokeless tobacco: Never Used   Substance Use Topics    Alcohol use: Yes    Drug use: Not Currently     Comment: per pt     Allergies:  No Known Allergies  Review of Systems   Review of Systems   Constitutional: Negative for chills and fever. HENT: Negative for congestion, rhinorrhea, sneezing and sore throat. Eyes: Negative for redness and visual disturbance. Respiratory: Negative for shortness of breath. Cardiovascular: Negative for leg swelling. Gastrointestinal: Negative for abdominal pain, nausea and vomiting. Genitourinary: Positive for dysuria, flank pain, frequency and vaginal discharge. Negative for difficulty urinating and pelvic pain. Musculoskeletal: Negative for back pain, myalgias and neck stiffness. Skin: Negative for rash. Neurological: Negative for dizziness, syncope, weakness and headaches. Hematological: Negative for adenopathy. All other systems reviewed and are negative. Physical Exam   Physical Exam  Vitals signs and nursing note reviewed. Constitutional:       Appearance: Normal appearance. She is well-developed. HENT:      Head: Normocephalic and atraumatic. Cardiovascular:      Rate and Rhythm: Normal rate and regular rhythm. Pulses: Normal pulses. Heart sounds: Normal heart sounds, S1 normal and S2 normal. No murmur. Pulmonary:      Effort: Pulmonary effort is normal. No respiratory distress. Breath sounds: Normal breath sounds. No wheezing. Abdominal:      General: Bowel sounds are normal. There is no distension. Palpations: Abdomen is soft. Tenderness: There is no tenderness. There is no rebound. Musculoskeletal: Normal range of motion. Skin:     General: Skin is warm and dry. Findings: No rash.    Neurological:      Mental Status: She is alert and oriented to person, place, and time. Psychiatric:         Speech: Speech normal.         Behavior: Behavior normal.         Thought Content: Thought content normal.         Judgment: Judgment normal.       Diagnostic Study Results   Labs -     Recent Results (from the past 12 hour(s))   URINALYSIS W/ REFLEX CULTURE    Collection Time: 12/24/19  1:55 PM   Result Value Ref Range    Color DARK YELLOW      Appearance CLOUDY (A) CLEAR      Specific gravity 1.030 1.003 - 1.030      pH (UA) 8.0 5.0 - 8.0      Protein TRACE (A) NEG mg/dL    Glucose NEGATIVE  NEG mg/dL    Ketone NEGATIVE  NEG mg/dL    Bilirubin NEGATIVE  NEG      Blood NEGATIVE  NEG      Urobilinogen 1.0 0.2 - 1.0 EU/dL    Nitrites NEGATIVE  NEG      Leukocyte Esterase SMALL (A) NEG      WBC 0-4 0 - 4 /hpf    RBC 0-5 0 - 5 /hpf    Epithelial cells MODERATE (A) FEW /lpf    Bacteria NEGATIVE  NEG /hpf    UA:UC IF INDICATED CULTURE NOT INDICATED BY UA RESULT CNI     WET PREP    Collection Time: 12/24/19  1:55 PM   Result Value Ref Range    Clue cells CLUE CELLS ABSENT      Wet prep NO TRICHOMONAS SEEN     KOH, OTHER SOURCES    Collection Time: 12/24/19  1:55 PM   Result Value Ref Range    Special Requests: NO SPECIAL REQUESTS      KOH NO YEAST SEEN         Radiologic Studies -   No orders to display     No results found. Medical Decision Making   I am the first provider for this patient. I reviewed the vital signs, available nursing notes, past medical history, past surgical history, family history and social history. Vital Signs-Reviewed the patient's vital signs. Patient Vitals for the past 12 hrs:   Temp Pulse Resp BP SpO2   12/24/19 1329 97.9 °F (36.6 °C) 85 16 94/55 97 %     Pulse Oximetry Analysis - 97% on RA    Records Reviewed: Nursing Notes    Provider Notes (Medical Decision Making):   41-year-old female presents with urinary frequency, dysuria, and vaginal discharge.   Differential includes UTI, BV, trichomonas, yeast vaginitis, chlamydia, and gonorrhea. Will offer to empirically treat for chlamydia and gonorrhea since we will not get those results back for 48 hours. ED Course:   Initial assessment performed. The patients presenting problems have been discussed, and they are in agreement with the care plan formulated and outlined with them. I have encouraged them to ask questions as they arise throughout their visit. Offered emperic treatment and pt would like to be treated today. Pt knows we won't get results for 2 day and will notify only if the results are positive. Pt has also been told to avoid intercourse for 7 days and inform sexual partner so they can get checked and/or treated if needed to prevent re-infection. Also enouraged condom use. Progress Note:   Updated pt on all returned results and findings. Discussed the importance of proper follow up as referred below along with return precautions. Pt in agreement with the care plan and expresses agreement with and understanding of all items discussed. Disposition:  Discharge Note:  The pt is ready for discharge. The pt's signs, symptoms, diagnosis, and discharge instructions have been discussed and pt has conveyed their understanding. The pt is to follow up as recommended or return to ER should their symptoms worsen. Plan has been discussed and pt is in agreement. PLAN:  1. Current Discharge Medication List        2. Follow-up Information     Follow up With Specialties Details Why Contact Info    Samy Zhong NP Family Practice Schedule an appointment as soon as possible for a visit  formerly [facility] [address]  [phone]      [facility] EMERGENCY DEPT Emergency Medicine  As needed, If symptoms worsen [place]  [phone]        Return to ED if worse     Diagnosis     Clinical Impression:   1. Possible exposure to STD    2. Vaginal discharge    3.  Dysuria            Please note that this dictation was completed with Dragon, computer voice recognition software. Quite often unanticipated grammatical, syntax, homophones, and other interpretive errors are inadvertently transcribed by the computer software. Please disregard these errors. Additionally, please excuse any errors that have escaped final proofreading.

## 2019-12-24 NOTE — DISCHARGE INSTRUCTIONS
Patient Education        Painful Urination (Dysuria): Care Instructions  Your Care Instructions  Burning pain with urination (dysuria) is a common symptom of a urinary tract infection or other urinary problems. The bladder may become inflamed. This can cause pain when the bladder fills and empties. You may also feel pain if the tube that carries urine from the bladder to the outside of the body (urethra) gets irritated or infected. Sexually transmitted infections (STIs) also may cause pain when you urinate. Sometimes the pain can be caused by things other than an infection. The urethra can be irritated by soaps, perfumes, or foreign objects in the urethra. Kidney stones can cause pain when they pass through the urethra. The cause may be hard to find. You may need tests. Treatment for painful urination depends on the cause. Follow-up care is a key part of your treatment and safety. Be sure to make and go to all appointments, and call your doctor if you are having problems. It's also a good idea to know your test results and keep a list of the medicines you take. How can you care for yourself at home? · Drink extra water for the next day or two. This will help make the urine less concentrated. (If you have kidney, heart, or liver disease and have to limit fluids, talk with your doctor before you increase the amount of fluids you drink.)  · Avoid drinks that are carbonated or have caffeine. They can irritate the bladder. · Urinate often. Try to empty your bladder each time. For women:  · Urinate right after you have sex. · After going to the bathroom, wipe from front to back. · Avoid douches, bubble baths, and feminine hygiene sprays. And avoid other feminine hygiene products that have deodorants. When should you call for help? Call your doctor now or seek immediate medical care if:    · You have new symptoms, such as fever, nausea, or vomiting.     · You have new or worse symptoms of a urinary problem.  For example:  ? You have blood or pus in your urine. ? You have chills or body aches. ? It hurts worse to urinate. ? You have groin or belly pain. ? You have pain in your back just below your rib cage (the flank area).    Watch closely for changes in your health, and be sure to contact your doctor if you have any problems. Where can you learn more? Go to http://trudi-ottoniel.info/. Enter Q594 in the search box to learn more about \"Painful Urination (Dysuria): Care Instructions. \"  Current as of: December 19, 2018  Content Version: 12.2  © 1554-4495 [employer]. Care instructions adapted under license by [employer] (which disclaims liability or warranty for this information). If you have questions about a medical condition or this instruction, always ask your healthcare professional. [place] [address] any warranty or liability for your use of this information. Patient Education        Exposure to Sexually Transmitted Infections: Care Instructions  Your Care Instructions  Sexually transmitted infections (STIs) are those diseases spread by sexual contact. There are at least 20 different STIs, including chlamydia, gonorrhea, syphilis, and human immunodeficiency virus (HIV), which causes AIDS. Bacteria-caused STIs can be treated and cured. STIs caused by viruses, such as HIV, can be treated but not cured. Some STIs can reduce a woman's chances of getting pregnant in the future. STIs are spread during sexual contact, such as vaginal intercourse and oral or anal sex. Follow-up care is a key part of your treatment and safety. Be sure to make and go to all appointments, and call your doctor if you are having problems. It's also a good idea to know your test results and keep a list of the medicines you take. How can you care for yourself at home? · Your doctor may have given you a shot of antibiotics.  If your doctor prescribed antibiotic pills, take them as directed. Do not stop taking them just because you feel better. You need to take the full course of antibiotics. · Do not have sexual contact while you have symptoms of an STI or are being treated for an STI. · Tell your sex partner (or partners) that he or she will need treatment. · If you are a woman, do not douche. Douching changes the normal balance of bacteria in the vagina and may spread an infection up into your reproductive organs. To prevent exposure to STIs in the future  · Use latex condoms every time you have sex. Use them from the beginning to the end of sexual contact. · Talk to your partner before you have sex. Find out if he or she has or is at risk for any STI. Keep in mind that a person may be able to spread an STI even if he or she does not have symptoms. · Do not have sex if you are being treated for an STI. · Do not have sex with anyone who has symptoms of an STI, such as sores on the genitals or mouth. · Having one sex partner (who does not have STIs and does not have sex with anyone else) is a good way to avoid STIs. When should you call for help? Call your doctor now or seek immediate medical care if:    · You have new pain in your belly or pelvis.     · You have symptoms of a urinary tract infection. These may include:  ? Pain or burning when you urinate. ? A frequent need to urinate without being able to pass much urine. ? Pain in the flank, which is just below the rib cage and above the waist on either side of the back. ? Blood in your urine.   ? A fever.     · You have new or worsening pain or swelling in the scrotum.    Watch closely for changes in your health, and be sure to contact your doctor if:    · You have unusual vaginal bleeding.     · You have a discharge from the vagina or penis.     · You have any new symptoms, such as sores, bumps, rashes, blisters, or warts.     · You have itching, tingling, pain, or burning in the genital or anal area.     · You think you may have an STI. Where can you learn more? Go to http://trudi-ottoniel.info/. Enter V060 in the search box to learn more about \"Exposure to Sexually Transmitted Infections: Care Instructions. \"  Current as of: September 11, 2018  Content Version: 12.2  © 7151-4357 [employer], [employer]. Care instructions adapted under license by [employer] (which disclaims liability or warranty for this information). If you have questions about a medical condition or this instruction, always ask your healthcare professional. [address] any warranty or liability for your use of this information.

## 2019-12-24 NOTE — LETTER
12/26/2019 Kwasi Villafana 
[address] [place] [address] Dear Ms. Montana Hall You were seen in the Emergency Department of [address] [place] [address] on 12/24/2019 and had lab and/or radiology tests performed. The gonorrhea from your Emergency Department visit on 12/24/2019 was positive. You were treated appropriately during your visit. No further treatment is required. If you have not improved or are worsening, please follow up with your primary care doctor or Emergency department as soon as possible. Your partner needs to be treated. If you have any questions please contact the Emergency Department at [phone]. Sincerely, Lashae Mena PA-C 
 
[facility] EMERGENCY DEPT 
[address] [place] [id number] [phone]

## 2019-12-26 LAB
C TRACH DNA SPEC QL NAA+PROBE: NEGATIVE
N GONORRHOEA DNA SPEC QL NAA+PROBE: POSITIVE
SAMPLE TYPE: ABNORMAL
SERVICE CMNT-IMP: ABNORMAL
SPECIMEN SOURCE: ABNORMAL

## 2020-11-09 NOTE — PROGRESS NOTES
~1940: Bedside and Verbal shift change report given to LING hAn RN by LISA Barroso RN. Report included the following information SBAR, MAR, Accordion and Recent Results. ~2150: TRANSFER - OUT REPORT:    Verbal report given to EVA Mancilla RN on Shahnaz Rodriguez  being transferred to [facility] for routine progression of care       Report consisted of patients Situation, Background, Assessment and   Recommendations(SBAR). Information from the following report(s) SBAR, Accordion and Recent Results was reviewed with the receiving nurse. Lines:   Peripheral IV 12/03/17 Right Arm (Active)   Site Assessment Clean, dry, & intact 12/3/2017  8:00 PM   Phlebitis Assessment 0 12/3/2017  8:00 PM   Infiltration Assessment 0 12/3/2017  8:00 PM   Dressing Status Clean, dry, & intact 12/3/2017  8:00 PM   Dressing Type Tape;Transparent 12/3/2017  8:00 PM   Hub Color/Line Status Pink 12/3/2017  8:00 PM   Alcohol Cap Used Yes 12/3/2017  2:53 AM        Opportunity for questions and clarification was provided.       Patient transported with:   Registered Nurse No

## 2021-09-27 ENCOUNTER — VIRTUAL VISIT (OUTPATIENT)
Dept: INTERNAL MEDICINE CLINIC | Age: 26
End: 2021-09-27
Payer: MEDICAID

## 2021-09-27 DIAGNOSIS — G44.319 ACUTE POST-TRAUMATIC HEADACHE, NOT INTRACTABLE: ICD-10-CM

## 2021-09-27 DIAGNOSIS — S16.1XXA NECK STRAIN, INITIAL ENCOUNTER: ICD-10-CM

## 2021-09-27 DIAGNOSIS — M54.6 ACUTE MIDLINE THORACIC BACK PAIN: ICD-10-CM
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
## 2021-09-27 PROCEDURE — 99213 OFFICE O/P EST LOW 20 MIN: CPT | Performed by: NURSE PRACTITIONER

## 2021-09-27 RX ORDER — TIZANIDINE 4 MG/1
4 TABLET ORAL EVERY 6 HOURS
Qty: 30 TABLET | Refills: 0 | Status: SHIPPED | OUTPATIENT
Start: 2021-09-27

## 2021-09-27 RX ORDER — METHYLPREDNISOLONE 4 MG/1
TABLET ORAL
Qty: 1 DOSE PACK | Refills: 0 | Status: SHIPPED | OUTPATIENT
Start: 2021-09-27

## 2021-09-27 RX ORDER — CYCLOBENZAPRINE HCL 5 MG
TABLET ORAL
COMMUNITY
Start: 2021-09-26 | End: 2021-09-27 | Stop reason: ALTCHOICE

## 2021-09-27 RX ORDER — BUTALBITAL, ACETAMINOPHEN AND CAFFEINE 50; 325; 40 MG/1; MG/1; MG/1
TABLET ORAL
COMMUNITY
Start: 2021-09-26

## 2021-09-27 RX ORDER — NAPROXEN 500 MG/1
TABLET ORAL
COMMUNITY
Start: 2021-09-26

## 2021-09-27 RX ORDER — CEPHALEXIN 500 MG/1
CAPSULE ORAL
COMMUNITY
Start: 2021-09-21
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
## 2021-10-01 ENCOUNTER — TELEPHONE (OUTPATIENT)
Dept: INTERNAL MEDICINE CLINIC | Age: 26
End: 2021-10-01

## 2021-10-01 NOTE — TELEPHONE ENCOUNTER
----- Message from Ashly Ribeiro sent at 10/1/2021 10:19 AM EDT -----  Regarding: COLIN/NP/TELEPHONE  Contact: [phone]  General Message/Vendor Calls    Caller's first and last name: Magalys Porter      Reason for call:  Request for prescription for \"Fluconazole\"      Callback required yes/no and why: Yes      Best contact number(s):  [phone]    Details to clarify the request: Patient would like to know if NP González Broderick would call in a prescription for \"Fluconazole\" for a yeast infection.       Ashly Ribeiro

## 2021-10-04 RX ORDER — FLUCONAZOLE 150 MG/1
150 TABLET ORAL DAILY
Qty: 2 TABLET | Refills: 0 | Status: SHIPPED | OUTPATIENT
Start: 2021-10-04 | End: 2021-10-05

## 2021-12-03 RX ORDER — METRONIDAZOLE 500 MG/1
500 TABLET ORAL 2 TIMES DAILY
Qty: 14 TABLET | Refills: 0 | Status: SHIPPED | OUTPATIENT
Start: 2021-12-03 | End: 2021-12-10

## 2022-03-19 PROBLEM — O45.90 PLACENTAL ABRUPTION AFFECTING DELIVERY: Status: ACTIVE | Noted: 2017-12-03

## 2022-04-14 ENCOUNTER — HOSPITAL ENCOUNTER (EMERGENCY)
Age: 27
Discharge: HOME | End: 2022-04-14
Attending: EMERGENCY MEDICINE
Payer: MEDICAID

## 2022-04-14 VITALS
DIASTOLIC BLOOD PRESSURE: 79 MMHG | WEIGHT: 117 LBS | SYSTOLIC BLOOD PRESSURE: 132 MMHG | OXYGEN SATURATION: 98 % | HEIGHT: 63 IN | RESPIRATION RATE: 16 BRPM | HEART RATE: 89 BPM | BODY MASS INDEX: 20.73 KG/M2 | TEMPERATURE: 98.7 F

## 2022-04-14 DIAGNOSIS — Z34.90 EARLY STAGE OF PREGNANCY: Primary | ICD-10-CM

## 2022-04-14 DIAGNOSIS — F17.200 SMOKING ADDICTION: ICD-10-CM

## 2022-04-14 LAB
APPEARANCE UR: CLEAR
BACTERIA URNS QL MICRO: NEGATIVE /HPF
BILIRUB UR QL: NEGATIVE
COLOR UR: ABNORMAL
EPITH CASTS URNS QL MICRO: ABNORMAL /LPF
GLUCOSE UR STRIP.AUTO-MCNC: NEGATIVE MG/DL
HCG UR QL: POSITIVE
HGB UR QL STRIP: NEGATIVE
KETONES UR QL STRIP.AUTO: NEGATIVE MG/DL
LEUKOCYTE ESTERASE UR QL STRIP.AUTO: NEGATIVE
MUCOUS THREADS URNS QL MICRO: ABNORMAL /LPF
NITRITE UR QL STRIP.AUTO: NEGATIVE
PH UR STRIP: 7 [PH] (ref 5–8)
PROT UR STRIP-MCNC: NEGATIVE MG/DL
RBC #/AREA URNS HPF: ABNORMAL /HPF (ref 0–5)
SP GR UR REFRACTOMETRY: 1.02 (ref 1–1.03)
UA: UC IF INDICATED,UAUC: ABNORMAL
UROBILINOGEN UR QL STRIP.AUTO: 1 EU/DL (ref 0.2–1)
WBC URNS QL MICRO: ABNORMAL /HPF (ref 0–4)

## 2022-04-14 PROCEDURE — 99283 EMERGENCY DEPT VISIT LOW MDM: CPT

## 2022-04-14 PROCEDURE — 81001 URINALYSIS AUTO W/SCOPE: CPT

## 2022-04-14 PROCEDURE — 81025 URINE PREGNANCY TEST: CPT

## 2022-04-14 RX ORDER — ONDANSETRON 4 MG/1
4 TABLET, ORALLY DISINTEGRATING ORAL
Qty: 20 TABLET | Refills: 0 | Status: SHIPPED | OUTPATIENT
Start: 2022-04-14

## 2022-04-14 NOTE — ED PROVIDER NOTES
EMERGENCY DEPARTMENT HISTORY AND PHYSICAL EXAM      Date: 4/14/2022  Patient Name: Suly Landon    History of Presenting Illness     Chief Complaint   Patient presents with    Nausea       History Provided By: Patient    HPI: Suly Landon, 31 y.o. female presents ambulatory with her daughter to the ED with cc of several weeks of mild but constant fatigue, hot flashes and no menstrual period since the end of February. She is concerned about pregnancy. She tells me she has been pregnant twice and has 2 children. She denies any vaginal bleeding. She denies any dysuria. She is able to drink water. She has had an episode or 2 of vomiting but most of what bothers her is the nausea. She tells me her OB/GYN is at [facility]. She has been well lately without fever. There has been no chest pain or shortness of breath. She denies abdominal pain. There are no other complaints, changes, or physical findings at this time. PCP: None    Current Outpatient Medications   Medication Sig Dispense Refill    ondansetron (ZOFRAN ODT) 4 mg disintegrating tablet Take 1 Tablet by mouth every eight (8) hours as needed for Nausea or Vomiting. 20 Tablet 0    prenatal vit-iron fumarate-fa 27 mg iron- 0.8 mg tab tablet Take 1 Tablet by mouth daily. 30 Tablet 0    cephALEXin (KEFLEX) 500 mg capsule       butalbital-acetaminophen-caffeine (FIORICET, ESGIC) -40 mg per tablet       naproxen (NAPROSYN) 500 mg tablet       methylPREDNISolone (MEDROL DOSEPACK) 4 mg tablet Use per dose pack 1 Dose Pack 0    tiZANidine (ZANAFLEX) 4 mg tablet Take 1 Tablet by mouth every six (6) hours. 30 Tablet 0    ergocalciferol (ERGOCALCIFEROL) 50,000 unit capsule Take 1 Cap by mouth every seven (7) days. (Patient not taking: Reported on 9/27/2021) 4 Cap 3    ibuprofen (MOTRIN) 600 mg tablet Take 1 Tab by mouth every six (6) hours as needed for Pain.  (Patient not taking: Reported on 9/27/2021) 40 Tab 0     Past History     Past Medical History:  Past Medical History:   Diagnosis Date    Anemia     Contact dermatitis and other eczema, due to unspecified cause     Migraines        Past Surgical History:  Past Surgical History:   Procedure Laterality Date    HX OTHER SURGICAL      eye surgery as a baby    HX OTHER SURGICAL      gum surgery as a child       Family History:  Family History   Problem Relation Age of Onset   [employer] Asthma Mother     Hypertension Mother     Hypertension Father        Social History:  Social History     Tobacco Use    Smoking status: Current Every Day Smoker     Packs/day: 0.25     Years: 7.00     Pack years: 1.75     Types: Cigarettes, Cigars    Smokeless tobacco: Never Used   Substance Use Topics    Alcohol use: Yes    Drug use: Not Currently     Comment: per pt       Allergies:  No Known Allergies  Review of Systems   Review of Systems   Constitutional: Positive for fatigue. Negative for fever. Respiratory: Negative for shortness of breath. Cardiovascular: Negative for chest pain. Gastrointestinal: Positive for nausea and vomiting. Negative for abdominal pain. Endocrine:        \"Hot flashes\"   Genitourinary: Positive for menstrual problem. Negative for dysuria and vaginal bleeding. All other systems reviewed and are negative. Physical Exam   Physical Exam  Vitals and nursing note reviewed. Constitutional:       General: She is not in acute distress. Appearance: She is well-developed. She is not toxic-appearing. HENT:      Head: Normocephalic and atraumatic. Jaw: No trismus. Right Ear: External ear normal.      Left Ear: External ear normal.      Nose: Nose normal.      Mouth/Throat:      Pharynx: Uvula midline. Eyes:      General: No scleral icterus. Conjunctiva/sclera: Conjunctivae normal.      Pupils: Pupils are equal, round, and reactive to light. Cardiovascular:      Rate and Rhythm: Normal rate and regular rhythm.    Pulmonary:      Effort: Pulmonary effort is normal. No respiratory distress. Abdominal:      Palpations: Abdomen is soft. Tenderness: There is no abdominal tenderness. Musculoskeletal:         General: Normal range of motion. Cervical back: Full passive range of motion without pain and normal range of motion. Skin:     Findings: No rash. Neurological:      Mental Status: She is alert and oriented to person, place, and time. She is not disoriented. GCS: GCS eye subscore is 4. GCS verbal subscore is 5. GCS motor subscore is 6. Cranial Nerves: No cranial nerve deficit. Psychiatric:         Speech: Speech normal.       Diagnostic Study Results     Labs -     Recent Results (from the past 12 hour(s))   URINALYSIS W/ REFLEX CULTURE    Collection Time: 04/14/22  7:27 PM    Specimen: Urine   Result Value Ref Range    Color YELLOW/STRAW      Appearance CLEAR CLEAR      Specific gravity 1.020 1.003 - 1.030      pH (UA) 7.0 5.0 - 8.0      Protein Negative NEG mg/dL    Glucose Negative NEG mg/dL    Ketone Negative NEG mg/dL    Bilirubin Negative NEG      Blood Negative NEG      Urobilinogen 1.0 0.2 - 1.0 EU/dL    Nitrites Negative NEG      Leukocyte Esterase Negative NEG      WBC 0-4 0 - 4 /hpf    RBC 0-5 0 - 5 /hpf    Epithelial cells FEW FEW /lpf    Bacteria Negative NEG /hpf    UA:UC IF INDICATED CULTURE NOT INDICATED BY UA RESULT CNI      Mucus 2+ (A) NEG /lpf   HCG URINE, QL. - POC    Collection Time: 04/14/22  7:28 PM   Result Value Ref Range    Pregnancy test,urine (POC) Positive (A) NEG         Radiologic Studies -   No orders to display     CT Results  (Last 48 hours)    None        CXR Results  (Last 48 hours)    None        Medical Decision Making   I am the first provider for this patient. I reviewed the vital signs, available nursing notes, past medical history, past surgical history, family history and social history. Vital Signs-Reviewed the patient's vital signs.   Patient Vitals for the past 12 hrs:   Temp Pulse Resp BP SpO2   04/14/22 1839 98.7 °F (37.1 °C) 89 16 132/79 98 %       Pulse Oximetry Analysis - 98% on RA    Records Reviewed: Nursing Notes, Old Medical Records, Previous Radiology Studies and Previous Laboratory Studies    Provider Notes (Medical Decision Making): Afebrile and well-appearing. Patient presents with several weeks of nausea and fatigue and episodes of \"hot flashes\". She tells me her last period was at the end of February and she is concerned about pregnancy. Point-of-care pregnancy test is positive. Urinalysis does not suggest urinary tract infection. Anticipate discharge with nausea medication and prenatal vitamins. Patient is urged to quit smoking. Will refer to OB/GYN at [address]:  Spent 1-5 minutes discussing the risks of smoking and the benefits of smoking cessation as well as the long term sequelae of smoking with the pt who verbalized his understanding. Reviewed strategies for success, including gradually decreasing the number of cigarettes smoked a day. ED Course:   Initial assessment performed. The patients presenting problems have been discussed, and they are in agreement with the care plan formulated and outlined with them. I have encouraged them to ask questions as they arise throughout their visit. Disposition:  Discharge    PLAN:  1. Current Discharge Medication List      START taking these medications    Details   ondansetron (ZOFRAN ODT) 4 mg disintegrating tablet Take 1 Tablet by mouth every eight (8) hours as needed for Nausea or Vomiting. Qty: 20 Tablet, Refills: 0  Start date: 4/14/2022      prenatal vit-iron fumarate-fa 27 mg iron- 0.8 mg tab tablet Take 1 Tablet by mouth daily. Qty: 30 Tablet, Refills: 0  Start date: 4/14/2022           2.    Follow-up Information     Follow up With Specialties Details Why Contact [facility] AT [address] [place] [address] Obstetrics & Gynecology Call  OB/GYN: Call tomorrow to schedule follow-up [facility] [address]  [address] [facility] [address]  [phone]        Return to ED if worse     Diagnosis     Clinical Impression:   1. Early stage of pregnancy    2.  Smoking addiction

## 2022-08-03 LAB
ANTIBODY SCREEN, EXTERNAL: NEGATIVE
CHLAMYDIA, EXTERNAL: POSITIVE
HBSAG, EXTERNAL: NEGATIVE
HEPATITIS C AB,   EXT: NEGATIVE
HIV, EXTERNAL: NON REACTIVE
N. GONORRHEA, EXTERNAL: NEGATIVE
RUBELLA, EXTERNAL: NORMAL
T. PALLIDUM, EXTERNAL: NON REACTIVE
TYPE, ABO & RH, EXTERNAL: NORMAL

## 2022-08-09 ENCOUNTER — HOSPITAL ENCOUNTER (EMERGENCY)
Age: 27
Discharge: HOME | End: 2022-08-09
Attending: EMERGENCY MEDICINE
Payer: MEDICAID

## 2022-08-09 VITALS
OXYGEN SATURATION: 97 % | DIASTOLIC BLOOD PRESSURE: 76 MMHG | WEIGHT: 133 LBS | HEIGHT: 63 IN | HEART RATE: 76 BPM | RESPIRATION RATE: 18 BRPM | BODY MASS INDEX: 23.57 KG/M2 | SYSTOLIC BLOOD PRESSURE: 129 MMHG | TEMPERATURE: 98.3 F

## 2022-08-09 DIAGNOSIS — Z20.2 EXPOSURE TO CHLAMYDIA: Primary | ICD-10-CM

## 2022-08-09 DIAGNOSIS — B37.31 YEAST VAGINITIS: ICD-10-CM

## 2022-08-09 LAB
APPEARANCE UR: CLEAR
BACTERIA URNS QL MICRO: NEGATIVE /HPF
BILIRUB UR QL: NEGATIVE
CLUE CELLS VAG QL WET PREP: NORMAL
COLOR UR: ABNORMAL
EPITH CASTS URNS QL MICRO: ABNORMAL /LPF
GLUCOSE UR STRIP.AUTO-MCNC: NEGATIVE MG/DL
HGB UR QL STRIP: NEGATIVE
KETONES UR QL STRIP.AUTO: NEGATIVE MG/DL
KOH PREP SPEC: NORMAL
LEUKOCYTE ESTERASE UR QL STRIP.AUTO: ABNORMAL
NITRITE UR QL STRIP.AUTO: NEGATIVE
PH UR STRIP: 7.5 [PH] (ref 5–8)
PROT UR STRIP-MCNC: ABNORMAL MG/DL
RBC #/AREA URNS HPF: ABNORMAL /HPF (ref 0–5)
SERVICE CMNT-IMP: NORMAL
SP GR UR REFRACTOMETRY: 1.02
T VAGINALIS VAG QL WET PREP: NORMAL
UA: UC IF INDICATED,UAUC: ABNORMAL
UROBILINOGEN UR QL STRIP.AUTO: 1 EU/DL (ref 0.2–1)
WBC URNS QL MICRO: ABNORMAL /HPF (ref 0–4)

## 2022-08-09 PROCEDURE — 87210 SMEAR WET MOUNT SALINE/INK: CPT

## 2022-08-09 PROCEDURE — 87491 CHLMYD TRACH DNA AMP PROBE: CPT

## 2022-08-09 PROCEDURE — 81001 URINALYSIS AUTO W/SCOPE: CPT

## 2022-08-09 PROCEDURE — 74011250637 HC RX REV CODE- 250/637: Performed by: EMERGENCY MEDICINE

## 2022-08-09 PROCEDURE — 99283 EMERGENCY DEPT VISIT LOW MDM: CPT

## 2022-08-09 RX ORDER — ASPIRIN 325 MG
1 TABLET, DELAYED RELEASE (ENTERIC COATED) ORAL
Qty: 7 APPLICATOR | Refills: 0 | Status: SHIPPED | OUTPATIENT
Start: 2022-08-09 | End: 2022-08-16

## 2022-08-09 RX ORDER — AZITHROMYCIN 500 MG/1
1000 TABLET, FILM COATED ORAL
Status: COMPLETED | OUTPATIENT
Start: 2022-08-09 | End: 2022-08-09

## 2022-08-09 RX ORDER — ASPIRIN 325 MG
1 TABLET, DELAYED RELEASE (ENTERIC COATED) ORAL
Qty: 7 APPLICATOR | Refills: 0 | Status: SHIPPED | OUTPATIENT
Start: 2022-08-09 | End: 2022-08-09

## 2022-08-09 RX ADMIN — AZITHROMYCIN MONOHYDRATE 1000 MG: 500 TABLET ORAL at 12:47

## 2022-08-09 NOTE — ED PROVIDER NOTES
EMERGENCY DEPARTMENT HISTORY AND PHYSICAL EXAM      Date: 2022  Patient Name: Melly Ocampo    History of Presenting Illness     Chief Complaint   Patient presents with    Exposure to STD     Partner tested positive for STD. Vaginal discharge & burning with urination x 1 month. Pt reports previously being seen & treated, still having symptoms. History Provided By: Patient    HPI: Melly Ocampo, 28 y.o. female with no significant past medical history who presents via private vehicle to the ED with cc of exposure to STD and vaginal discharge. Patient states her boyfriend tested positive for chlamydia yesterday and she is here for testing and treatment. She does endorse a clear vaginal discharge but denies any pain. She is approximately 20 weeks pregnant and is followed by [facility]. She denies any dysuria or significant pelvic pain. She does have some occasional pain but she believes that is due to the pregnancy. She is a A2. PMHx: Anemia  Social Hx: Occasional tobacco use, denies alcohol use    PCP: None    There are no other complaints, changes, or physical findings at this time. No current facility-administered medications on file prior to encounter. Current Outpatient Medications on File Prior to Encounter   Medication Sig Dispense Refill    ondansetron (ZOFRAN ODT) 4 mg disintegrating tablet Take 1 Tablet by mouth every eight (8) hours as needed for Nausea or Vomiting. 20 Tablet 0    prenatal vit-iron fumarate-fa 27 mg iron- 0.8 mg tab tablet Take 1 Tablet by mouth daily. 30 Tablet 0    cephALEXin (KEFLEX) 500 mg capsule       butalbital-acetaminophen-caffeine (FIORICET, ESGIC) -40 mg per tablet       naproxen (NAPROSYN) 500 mg tablet       methylPREDNISolone (MEDROL DOSEPACK) 4 mg tablet Use per dose pack 1 Dose Pack 0    tiZANidine (ZANAFLEX) 4 mg tablet Take 1 Tablet by mouth every six (6) hours.  30 Tablet 0    ergocalciferol (ERGOCALCIFEROL) 50,000 unit capsule Take 1 Cap by mouth every seven (7) days. (Patient not taking: Reported on 9/27/2021) 4 Cap 3    ibuprofen (MOTRIN) 600 mg tablet Take 1 Tab by mouth every six (6) hours as needed for Pain. (Patient not taking: Reported on 9/27/2021) 40 Tab 0     Past History     Past Medical History:  Past Medical History:   Diagnosis Date    Anemia     Contact dermatitis and other eczema, due to unspecified cause     Migraines      Past Surgical History:  Past Surgical History:   Procedure Laterality Date    HX OTHER SURGICAL      eye surgery as a baby    HX OTHER SURGICAL      gum surgery as a child     Family History:  Family History   Problem Relation Age of Onset    Asthma Mother     Hypertension Mother     Hypertension Father      Social History:  Social History     Tobacco Use    Smoking status: Every Day     Packs/day: 0.25     Years: 7.00     Pack years: 1.75     Types: Cigarettes, Cigars    Smokeless tobacco: Never   Substance Use Topics    Alcohol use: Yes    Drug use: Not Currently     Comment: per pt     Allergies:  No Known Allergies  Review of Systems   Review of Systems   Constitutional:  Negative for chills and fever. HENT:  Negative for congestion, rhinorrhea, sneezing and sore throat. Eyes:  Negative for redness and visual disturbance. Respiratory:  Negative for shortness of breath. Cardiovascular:  Negative for leg swelling. Gastrointestinal:  Negative for abdominal pain, nausea and vomiting. Genitourinary:  Positive for vaginal discharge. Negative for difficulty urinating, dysuria, frequency and vaginal pain. Musculoskeletal:  Negative for back pain, myalgias and neck stiffness. Skin:  Negative for rash. Neurological:  Negative for dizziness, syncope, weakness and headaches. Hematological:  Negative for adenopathy. All other systems reviewed and are negative. Physical Exam   Physical Exam  Vitals and nursing note reviewed. Constitutional:       Appearance: Normal appearance. She is well-developed. HENT:      Head: Normocephalic and atraumatic. Eyes:      Conjunctiva/sclera: Conjunctivae normal.   Cardiovascular:      Rate and Rhythm: Normal rate and regular rhythm. Pulses: Normal pulses. Heart sounds: Normal heart sounds, S1 normal and S2 normal.   Pulmonary:      Effort: Pulmonary effort is normal. No respiratory distress. Breath sounds: Normal breath sounds. No wheezing. Abdominal:      General: Bowel sounds are normal. There is no distension. Palpations: Abdomen is soft. Tenderness: There is no abdominal tenderness. There is no rebound. Comments: Gravid uterus   Musculoskeletal:         General: Normal range of motion. Cervical back: Full passive range of motion without pain, normal range of motion and neck supple. Skin:     General: Skin is warm and dry. Findings: No rash. Neurological:      Mental Status: She is alert and oriented to person, place, and time. Psychiatric:         Speech: Speech normal.         Behavior: Behavior normal.         Thought Content:  Thought content normal.         Judgment: Judgment normal.     Diagnostic Study Results   Labs -     Recent Results (from the past 12 hour(s))   URINALYSIS W/ REFLEX CULTURE    Collection Time: 08/09/22 12:09 PM    Specimen: Urine   Result Value Ref Range    Color YELLOW/STRAW      Appearance CLEAR CLEAR      Specific gravity 1.025      pH (UA) 7.5 5.0 - 8.0      Protein TRACE (A) NEG mg/dL    Glucose Negative NEG mg/dL    Ketone Negative NEG mg/dL    Bilirubin Negative NEG      Blood Negative NEG      Urobilinogen 1.0 0.2 - 1.0 EU/dL    Nitrites Negative NEG      Leukocyte Esterase SMALL (A) NEG      WBC 0-4 0 - 4 /hpf    RBC 0-5 0 - 5 /hpf    Epithelial cells MODERATE (A) FEW /lpf    Bacteria Negative NEG /hpf    UA:UC IF INDICATED CULTURE NOT INDICATED BY UA RESULT CNI     WET PREP    Collection Time: 08/09/22 12:09 PM    Specimen: Miscellaneous sample   Result Value Ref Range    Clue cells CLUE CELLS ABSENT      Wet prep NO TRICHOMONAS SEEN     KOH, OTHER SOURCES    Collection Time: 08/09/22 12:09 PM    Specimen: Vaginal Specimen; Other   Result Value Ref Range    Special Requests: NO SPECIAL REQUESTS      KOH 1+  YEAST           Radiologic Studies -   No orders to display     No results found. Medical Decision Making   I am the first provider for this patient. I reviewed the vital signs, available nursing notes, past medical history, past surgical history, family history and social history. Vital Signs-Reviewed the patient's vital signs. Patient Vitals for the past 24 hrs:   Temp Pulse Resp BP SpO2   08/09/22 1148 98.3 °F (36.8 °C) 76 18 129/76 97 %     Pulse Oximetry Analysis - 97% on RA (normal)    Records Reviewed: Nursing Notes and Old Medical Records    Provider Notes (Medical Decision Making):   70-year-old female presents with vaginal discharge and exposure to chlamydia. I will empirically treat for chlamydia given her exposure and discharge and send pelvic swabs. ED Course:   Initial assessment performed. The patients presenting problems have been discussed, and they are in agreement with the care plan formulated and outlined with them. I have encouraged them to ask questions as they arise throughout their visit. Progress Note  12:35 PM  Patient's swab is positive for yeast as well. Will discharge with a prescription for Clotrimazole. Progress Note:   Updated pt on all returned results and findings. Discussed the importance of proper follow up as referred below along with return precautions. Pt in agreement with the care plan and expresses agreement with and understanding of all items discussed. Disposition:  Discharge Note:  The pt is ready for discharge. The pt's signs, symptoms, diagnosis, and discharge instructions have been discussed and pt has conveyed their understanding.  The pt is to follow up as recommended or return to ER should their symptoms worsen. Plan has been discussed and pt is in agreement. PLAN:  1. Current Discharge Medication List        START taking these medications    Details   clotrimazole (MYCELEX) 1 % vaginal cream Insert 1 Applicator into vagina nightly for 7 days. Qty: 7 Applicator, Refills: 0  Start date: 8/9/2022, End date: 8/16/2022           2. Follow-up Information       Follow up With Specialties Details Why Contact Info    Otilio Berger NP Nurse Practitioner Schedule an appointment as soon as possible for a visit   [address] [id number]  [phone]      [facility] EMERGENCY DEPT Emergency Medicine  As needed, If symptoms worsen [place]  [phone]          Return to ED if worse     Diagnosis     Clinical Impression:   1. Exposure to chlamydia    2. Yeast vaginitis            Please note that this dictation was completed with Dragon, computer voice recognition software. Quite often unanticipated grammatical, syntax, homophones, and other interpretive errors are inadvertently transcribed by the computer software. Please disregard these errors. Additionally, please excuse any errors that have escaped final proofreading.

## 2022-08-09 NOTE — ED NOTES
Discharge instructions were given to the patient by Selina Poalnco RN. The patient left the Emergency Department ambulatory, alert and oriented and in no acute distress with 1 prescriptions. The patient was encouraged to call or return to the ED for worsening issues or problems and was encouraged to schedule a follow up appointment for continuing care. The patient verbalized understanding of discharge instructions and prescriptions, all questions were answered. The patient has no further concerns at this time.

## 2022-08-09 NOTE — ED NOTES
Pt presents ambulatory to ED complaining of white, thin vaginal discharge with a foul odor that started about a month ago. Pt reports her boyfriend just tested positive for chlamydia yesterday. Pt is currently pregnant. Pt is alert and oriented x 4, RR even and unlabored, skin is warm and dry. Assesment completed and pt updated on plan of care. Emergency Department Nursing Plan of Care       The Nursing Plan of Care is developed from the Nursing assessment and Emergency Department Attending provider initial evaluation. The plan of care may be reviewed in the ED Provider note.     The Plan of Care was developed with the following considerations:   Patient / Family readiness to learn indicated by:verbalized understanding  Persons(s) to be included in education: patient  Barriers to Learning/Limitations:No    Signed     Balaji Morley RN    8/9/2022   12:33 PM

## 2022-09-07 LAB
ANTIBODY SCREEN, EXTERNAL: NEGATIVE
CHLAMYDIA, EXTERNAL: NEGATIVE
T. PALLIDUM, EXTERNAL: NON REACTIVE

## 2022-10-31 LAB — GRBS, EXTERNAL: POSITIVE

## 2022-11-16 ENCOUNTER — HOSPITAL ENCOUNTER (EMERGENCY)
Age: 27
Discharge: HOME | End: 2022-11-16
Payer: COMMERCIAL

## 2022-11-16 VITALS
SYSTOLIC BLOOD PRESSURE: 127 MMHG | RESPIRATION RATE: 14 BRPM | DIASTOLIC BLOOD PRESSURE: 70 MMHG | HEART RATE: 105 BPM | TEMPERATURE: 98.3 F

## 2022-11-16 LAB
ALBUMIN SERPL-MCNC: 3.2 G/DL (ref 3.5–5)
ALBUMIN/GLOB SERPL: 0.7 {RATIO} (ref 1.1–2.2)
ALP SERPL-CCNC: 289 U/L (ref 45–117)
ALT SERPL-CCNC: 22 U/L (ref 12–78)
ANION GAP SERPL CALC-SCNC: 8 MMOL/L (ref 5–15)
APPEARANCE UR: ABNORMAL
AST SERPL-CCNC: 16 U/L (ref 15–37)
BACTERIA URNS QL MICRO: ABNORMAL /HPF
BILIRUB SERPL-MCNC: 0.9 MG/DL (ref 0.2–1)
BILIRUB UR QL: NEGATIVE
BUN SERPL-MCNC: 10 MG/DL (ref 6–20)
BUN/CREAT SERPL: 18 (ref 12–20)
CALCIUM SERPL-MCNC: 8.7 MG/DL (ref 8.5–10.1)
CHLORIDE SERPL-SCNC: 105 MMOL/L (ref 97–108)
CO2 SERPL-SCNC: 24 MMOL/L (ref 21–32)
COLOR UR: ABNORMAL
CREAT SERPL-MCNC: 0.56 MG/DL (ref 0.55–1.02)
EPITH CASTS URNS QL MICRO: ABNORMAL /LPF
ERYTHROCYTE [DISTWIDTH] IN BLOOD BY AUTOMATED COUNT: 13.7 % (ref 11.5–14.5)
GLOBULIN SER CALC-MCNC: 4.4 G/DL (ref 2–4)
GLUCOSE SERPL-MCNC: 81 MG/DL (ref 65–100)
GLUCOSE UR STRIP.AUTO-MCNC: NEGATIVE MG/DL
HCT VFR BLD AUTO: 39.6 % (ref 35–47)
HGB BLD-MCNC: 13.7 G/DL (ref 11.5–16)
HGB UR QL STRIP: NEGATIVE
HYALINE CASTS URNS QL MICRO: ABNORMAL /LPF (ref 0–5)
KETONES UR QL STRIP.AUTO: 40 MG/DL
LEUKOCYTE ESTERASE UR QL STRIP.AUTO: ABNORMAL
MCH RBC QN AUTO: 33.2 PG (ref 26–34)
MCHC RBC AUTO-ENTMCNC: 34.6 G/DL (ref 30–36.5)
MCV RBC AUTO: 95.9 FL (ref 80–99)
NITRITE UR QL STRIP.AUTO: NEGATIVE
NRBC # BLD: 0 K/UL (ref 0–0.01)
NRBC BLD-RTO: 0 PER 100 WBC
PH UR STRIP: 6.5 [PH] (ref 5–8)
PLATELET # BLD AUTO: 270 K/UL (ref 150–400)
PMV BLD AUTO: 10.2 FL (ref 8.9–12.9)
POTASSIUM SERPL-SCNC: 3.8 MMOL/L (ref 3.5–5.1)
PROT SERPL-MCNC: 7.6 G/DL (ref 6.4–8.2)
PROT UR STRIP-MCNC: 30 MG/DL
RBC # BLD AUTO: 4.13 M/UL (ref 3.8–5.2)
RBC #/AREA URNS HPF: ABNORMAL /HPF (ref 0–5)
SODIUM SERPL-SCNC: 137 MMOL/L (ref 136–145)
SP GR UR REFRACTOMETRY: 1.03 (ref 1–1.03)
UA: UC IF INDICATED,UAUC: ABNORMAL
UROBILINOGEN UR QL STRIP.AUTO: 1 EU/DL (ref 0.2–1)
WBC # BLD AUTO: 10.1 K/UL (ref 3.6–11)
WBC URNS QL MICRO: ABNORMAL /HPF (ref 0–4)

## 2022-11-16 PROCEDURE — 81001 URINALYSIS AUTO W/SCOPE: CPT

## 2022-11-16 PROCEDURE — 36415 COLL VENOUS BLD VENIPUNCTURE: CPT

## 2022-11-16 PROCEDURE — 96375 TX/PRO/DX INJ NEW DRUG ADDON: CPT

## 2022-11-16 PROCEDURE — 96360 HYDRATION IV INFUSION INIT: CPT

## 2022-11-16 PROCEDURE — 74011000250 HC RX REV CODE- 250: Performed by: NURSE PRACTITIONER

## 2022-11-16 PROCEDURE — 85027 COMPLETE CBC AUTOMATED: CPT

## 2022-11-16 PROCEDURE — 96374 THER/PROPH/DIAG INJ IV PUSH: CPT

## 2022-11-16 PROCEDURE — 74011250636 HC RX REV CODE- 250/636: Performed by: NURSE PRACTITIONER

## 2022-11-16 PROCEDURE — 99285 EMERGENCY DEPT VISIT HI MDM: CPT

## 2022-11-16 PROCEDURE — 74011000258 HC RX REV CODE- 258: Performed by: NURSE PRACTITIONER

## 2022-11-16 PROCEDURE — 80053 COMPREHEN METABOLIC PANEL: CPT

## 2022-11-16 PROCEDURE — 96366 THER/PROPH/DIAG IV INF ADDON: CPT

## 2022-11-16 RX ORDER — ONDANSETRON 2 MG/ML
4 INJECTION INTRAMUSCULAR; INTRAVENOUS ONCE
Status: COMPLETED | OUTPATIENT
Start: 2022-11-16 | End: 2022-11-16

## 2022-11-16 RX ORDER — FAMOTIDINE 10 MG/1
10 TABLET ORAL 2 TIMES DAILY
COMMUNITY
End: 2022-11-26

## 2022-11-16 RX ORDER — SODIUM CHLORIDE, SODIUM LACTATE, POTASSIUM CHLORIDE, CALCIUM CHLORIDE 600; 310; 30; 20 MG/100ML; MG/100ML; MG/100ML; MG/100ML
500 INJECTION, SOLUTION INTRAVENOUS CONTINUOUS
Status: DISCONTINUED | OUTPATIENT
Start: 2022-11-16 | End: 2022-11-17 | Stop reason: HOSPADM

## 2022-11-16 RX ADMIN — FAMOTIDINE 20 MG: 10 INJECTION, SOLUTION INTRAVENOUS at 21:15

## 2022-11-16 RX ADMIN — PROMETHAZINE HYDROCHLORIDE 12.5 MG: 25 INJECTION INTRAMUSCULAR; INTRAVENOUS at 22:32

## 2022-11-16 RX ADMIN — SODIUM CHLORIDE, POTASSIUM CHLORIDE, SODIUM LACTATE AND CALCIUM CHLORIDE 999 ML/HR: 600; 310; 30; 20 INJECTION, SOLUTION INTRAVENOUS at 21:05

## 2022-11-16 RX ADMIN — ONDANSETRON 4 MG: 2 INJECTION INTRAMUSCULAR; INTRAVENOUS at 21:13

## 2022-11-17 NOTE — DISCHARGE INSTRUCTIONS
Week 38 of Your Pregnancy: Care Instructions  Believe it or not, your baby is almost here. You may notice how your baby responds to sounds, warmth, cold, and light. You may even know what kind of music your baby likes. Even if you want a vaginal birth, it's a good idea to learn about  section ().  is the delivery of a baby through a cut (incision) in your belly. It's a major surgery, so it has more risks than a vaginal birth. During the first 2 weeks after the birth, limit visitors. Don't allow visitors who have colds or infections. Ask visitors to wash their hands before they touch your baby. And never let anyone smoke around your baby. Know about unplanned C-sections. Reasons for an unplanned  include labor that slows or stops, signs of distress in your baby, and high blood pressure or other problems for you. Know about planned C-sections. If your baby isn't in a head-down position for delivery (breech position), your doctor may plan a . Or you may have a planned  if you're having twins or more or if your baby weighs 9 pounds or more. Get as much help as you can while you're in the hospital.  You can learn about feeding, diapering, and bathing your baby. Talk to your doctor or midwife about how to care for the umbilical cord stump. If your baby will be circumcised, also ask about how to care for that. Ask friends or family for help, as you need it. If you can, have another adult in your home for at least 2 or 3 days after the birth. Try to nap when your baby naps. This may be your best chance to get some sleep. Watch for changes in your mental health. For the first 1 to 2 weeks after birth, it's common to cry or feel sad or irritable. If these feelings last for more than 2 weeks, you may have postpartum depression. Ask your doctor for help. Postpartum depression can be treated.    Follow-up care is a key part of your treatment and safety. Be sure to make and go to all appointments, and call your doctor if you are having problems. It's also a good idea to know your test results and keep a list of the medicines you take. Where can you learn more? Go to http://www.gray.com/  Enter B044 in the search box to learn more about \"Week 38 of Your Pregnancy: Care Instructions. \"  Current as of: 2022               Content Version: 13.4   [employer]. Care instructions adapted under license by [employer] (which disclaims liability or warranty for this information). If you have questions about a medical condition or this instruction, always ask your healthcare professional. [place] [address] any warranty or liability for your use of this information. Pregnancy Precautions: Care Instructions  Your Care Instructions     There is no sure way to prevent labor before your due date ( labor) or to prevent most other pregnancy problems. But there are things you can do to increase your chances of a healthy pregnancy. Go to your appointments, follow your doctor's advice, and take good care of yourself. Eat well, and exercise (if your doctor agrees). And make sure to drink plenty of water. Follow-up care is a key part of your treatment and safety. Be sure to make and go to all appointments, and call your doctor if you are having problems. It's also a good idea to know your test results and keep a list of the medicines you take. How can you care for yourself at home? Make sure you go to your prenatal appointments. At each visit, your doctor will check your blood pressure. Your doctor will also check to see if you have protein in your urine. High blood pressure and protein in urine are signs of preeclampsia. This condition can be dangerous for you and your baby. Drink plenty of fluids. Dehydration can cause contractions.  If you have kidney, heart, or liver disease and have to limit fluids, talk with your doctor before you increase the amount of fluids you drink. Tell your doctor right away if you notice any symptoms of an infection, such as:  Burning when you urinate. A foul-smelling discharge from your vagina. Vaginal itching. Unexplained fever. Unusual pain or soreness in your uterus or lower belly. Eat a balanced diet. Include plenty of foods that are high in calcium and iron. Foods high in calcium include milk, cheese, yogurt, almonds, and broccoli. Foods high in iron include red meat, shellfish, poultry, eggs, beans, raisins, whole-grain bread, and leafy green vegetables. Do not smoke. If you need help quitting, talk to your doctor about stop-smoking programs and medicines. These can increase your chances of quitting for good. Do not drink alcohol or use marijuana or illegal drugs. Follow your doctor's directions about activity. Your doctor will let you know how much, if any, exercise you can do. Ask your doctor if you can have sex. If you are at risk for early labor, your doctor may ask you to not have sex. Take care to prevent falls. During pregnancy, your joints are loose, and your balance is off. Sports such as bicycling, skiing, or in-line skating can increase your risk of falling. And don't ride horses or motorcycles, dive, water ski, scuba dive, or parachute jump while you are pregnant. Avoid things that can make your body too hot and may be harmful to your baby, such as a hot tub or sauna. Or talk with your doctor before doing anything that raises your body temperature. Your doctor can tell you if it's safe. Do not take any over-the-counter or herbal medicines or supplements without talking to your doctor or pharmacist first.  When should you call for help? Call 911  anytime you think you may need emergency care. For example, call if:    You passed out (lost consciousness). You have a seizure. You have severe vaginal bleeding. You have severe pain in your belly or pelvis. You have had fluid gushing or leaking from your vagina and you know or think the umbilical cord is bulging into your vagina. If this happens, immediately get down on your knees so your rear end (buttocks) is higher than your head. This will decrease the pressure on the cord until help arrives. Call your doctor now or seek immediate medical care if:    You have signs of preeclampsia, such as:  Sudden swelling of your face, hands, or feet. New vision problems (such as dimness, blurring, or seeing spots). A severe headache. You have any vaginal bleeding. You have belly pain or cramping. You have a fever. You have had regular contractions (with or without pain) for an hour. This means that you have 8 or more within 1 hour or 4 or more in 20 minutes after you change your position and drink fluids. You have a sudden release of fluid from your vagina. You have low back pain or pelvic pressure that does not go away. You notice that your baby has stopped moving or is moving much less than normal.   Watch closely for changes in your health, and be sure to contact your doctor if you have any problems. Where can you learn more? Go to http://www.[employer].com/  Enter Y951 in the search box to learn more about \"Pregnancy Precautions: Care Instructions. \"  Current as of: February 23, 2022               Content Version: 13.4  © 9832-5345 [employer]. Care instructions adapted under license by [employer] (which disclaims liability or warranty for this information). If you have questions about a medical condition or this instruction, always ask your healthcare professional. [place] [address] any warranty or liability for your use of this information.

## 2022-11-17 NOTE — PROGRESS NOTES
11/16/2022  8:34 PM Patient arrived to [facility] with complaints of vomiting and back pain/abdominal cramping since 1400 today. Patient reports positive fetal movement and denies any bleeding or loss of fluid. 2043: [address] notified of patient's arrival.  2047: [address] at bedside to see patient and discuss plan of care. 2259: Patient discharged home, undelivered. Discharge instructions reviewed with patient and patient given copy of d/c instructions to take home. Electronic signature obtained. No further questions or complaints at this time.

## 2022-11-17 NOTE — ED PROVIDER NOTES
28yo [id number] KIRSTIN  38w3d. C/o vomiting x 6 episodes in 6 hours. Increase Acid reflex and mild upper abdominal cramping. Attempted popcicle and smoothie and has not been able to keep down. Denies diarrhea, headache, body aches, fever, bleeding, leaking of fluid or decrease FM. No other family member is sick. No acute specific pain noted. Routine prenatal care with [place]. No complications in pregnancy.  x 2. Vomiting During Pregnancy   Associated symptoms include nausea and vomiting. Pregnancy Problem   Associated symptoms include nausea and vomiting. Past Medical History:   Diagnosis Date    Anemia     Contact dermatitis and other eczema, due to unspecified cause     Migraines        Past Surgical History:   Procedure Laterality Date    HX OTHER SURGICAL      eye surgery as a baby    HX OTHER SURGICAL      gum surgery as a child         Family History:   Problem Relation Age of Onset   Theodoro Milder Asthma Mother     Hypertension Mother     Hypertension Father        Social History     Socioeconomic History    Marital status: SINGLE     Spouse name: Not on file    Number of children: Not on file    Years of education: Not on file    Highest education level: Not on file   Occupational History    Not on file   Tobacco Use    Smoking status: Every Day     Packs/day: 0.25     Years: 7.00     Pack years: 1.75     Types: Cigarettes, Cigars    Smokeless tobacco: Never   Substance and Sexual Activity    Alcohol use:  Yes    Drug use: Not Currently     Comment: per pt    Sexual activity: Yes     Partners: Male     Birth control/protection: None   Other Topics Concern    Not on file   Social History Narrative    Not on file     Social Determinants of Health     Financial Resource Strain: Not on file   Food Insecurity: Not on file   Transportation Needs: Not on file   Physical Activity: Not on file   Stress: Not on file   Social Connections: Not on file   Intimate Partner Violence: Not on file Housing Stability: Not on file         ALLERGIES: Patient has no known allergies. Review of Systems   Constitutional:  Positive for appetite change. Gastrointestinal:  Positive for nausea and vomiting. All other systems reviewed and are negative. Vitals:    22 2044   BP: 127/70   Pulse: (!) 105   Temp: 98.3 °F (36.8 °C)            Physical Exam  Vitals and nursing note reviewed. Cardiovascular:      Rate and Rhythm: Normal rate. Pulmonary:      Effort: Pulmonary effort is normal.   Abdominal:      General: Bowel sounds are normal.      Palpations: Abdomen is soft. Genitourinary:     Comments:  S=d  NST: , moderate variabilty, + accelerations, no decelerations. +FM  Ctx: q 6-8min, lasting 40-50sec, mild palpation. Mild tightening with ctx. Skin:     General: Skin is warm. Neurological:      Mental Status: She is alert. MDM     Amount and/or Complexity of Data Reviewed  Review and summarize past medical records: yes    Risk of Complications, Morbidity, and/or Mortality  Presenting problems: moderate  Diagnostic procedures: moderate  Management options: moderate           Assessment:  26yo   38wks  N/V in pregnancy  GERD  Not in active labor    Plan:  NST reactive, cat1  CBC, CMP, UA  IVF, zofran IV, pepcid IV, phenergan IV prn  D/c home once able to tolerate fluids and crackers and no vomiting occurred. Advised liquid diet, BRAT diet, and labor precautions. Follow up with OB at next appt.

## 2022-11-17 NOTE — PROGRESS NOTES
[facility] November 16, 2022       RE: Merlyn Da Silva      To Whom It May Concern,    This is to certify that Merlyn Da Silva may may return to work on 11/18/2022. Please feel free to contact my office if you have any questions or concerns. Thank you for your assistance in this matter.       Sincerely,  Jaskaran Rogers RN and Mateus Mendoza CNM

## 2022-11-24 ENCOUNTER — ANESTHESIA (OUTPATIENT)
Dept: LABOR AND DELIVERY | Age: 27
End: 2022-11-24
Payer: COMMERCIAL

## 2022-11-24 ENCOUNTER — HOSPITAL ENCOUNTER (INPATIENT)
Age: 27
LOS: 2 days | Discharge: HOME | End: 2022-11-26
Attending: OBSTETRICS & GYNECOLOGY | Admitting: OBSTETRICS & GYNECOLOGY
Payer: COMMERCIAL

## 2022-11-24 ENCOUNTER — ANESTHESIA EVENT (OUTPATIENT)
Dept: LABOR AND DELIVERY | Age: 27
End: 2022-11-24
Payer: COMMERCIAL

## 2022-11-24 PROBLEM — Z3A.39 39 WEEKS GESTATION OF PREGNANCY: Status: ACTIVE | Noted: 2022-11-24

## 2022-11-24 LAB
APTT PPP: 28.2 SEC (ref 22.1–31)
ERYTHROCYTE [DISTWIDTH] IN BLOOD BY AUTOMATED COUNT: 13.6 % (ref 11.5–14.5)
FIBRINOGEN PPP-MCNC: 420 MG/DL (ref 200–475)
HCT VFR BLD AUTO: 33.8 % (ref 35–47)
HGB BLD-MCNC: 11.5 G/DL (ref 11.5–16)
INR PPP: 0.9 (ref 0.9–1.1)
MCH RBC QN AUTO: 32.7 PG (ref 26–34)
MCHC RBC AUTO-ENTMCNC: 34 G/DL (ref 30–36.5)
MCV RBC AUTO: 96 FL (ref 80–99)
NRBC # BLD: 0 K/UL (ref 0–0.01)
NRBC BLD-RTO: 0 PER 100 WBC
PLATELET # BLD AUTO: 282 K/UL (ref 150–400)
PMV BLD AUTO: 10.4 FL (ref 8.9–12.9)
PROTHROMBIN TIME: 9.7 SEC (ref 9–11.1)
RBC # BLD AUTO: 3.52 M/UL (ref 3.8–5.2)
THERAPEUTIC RANGE,PTTT: NORMAL SECS (ref 58–77)
WBC # BLD AUTO: 10.3 K/UL (ref 3.6–11)

## 2022-11-24 PROCEDURE — 74011250636 HC RX REV CODE- 250/636: Performed by: ANESTHESIOLOGY

## 2022-11-24 PROCEDURE — 75410000003 HC RECOV DEL/VAG/CSECN EA 0.5 HR

## 2022-11-24 PROCEDURE — 36415 COLL VENOUS BLD VENIPUNCTURE: CPT

## 2022-11-24 PROCEDURE — 99284 EMERGENCY DEPT VISIT MOD MDM: CPT

## 2022-11-24 PROCEDURE — 74011250637 HC RX REV CODE- 250/637: Performed by: OBSTETRICS & GYNECOLOGY

## 2022-11-24 PROCEDURE — 76060000078 HC EPIDURAL ANESTHESIA

## 2022-11-24 PROCEDURE — 75410000000 HC DELIVERY VAGINAL/SINGLE

## 2022-11-24 PROCEDURE — 74011000250 HC RX REV CODE- 250: Performed by: ANESTHESIOLOGY

## 2022-11-24 PROCEDURE — 75410000002 HC LABOR FEE PER 1 HR

## 2022-11-24 PROCEDURE — 74011250636 HC RX REV CODE- 250/636: Performed by: OBSTETRICS & GYNECOLOGY

## 2022-11-24 PROCEDURE — 88307 TISSUE EXAM BY PATHOLOGIST: CPT

## 2022-11-24 PROCEDURE — 85027 COMPLETE CBC AUTOMATED: CPT

## 2022-11-24 PROCEDURE — 65410000002 HC RM PRIVATE OB

## 2022-11-24 PROCEDURE — 74011250636 HC RX REV CODE- 250/636

## 2022-11-24 PROCEDURE — 85610 PROTHROMBIN TIME: CPT

## 2022-11-24 PROCEDURE — 74011000258 HC RX REV CODE- 258: Performed by: OBSTETRICS & GYNECOLOGY

## 2022-11-24 PROCEDURE — 00HU33Z INSERTION OF INFUSION DEVICE INTO SPINAL CANAL, PERCUTANEOUS APPROACH: ICD-10-PCS | Performed by: ANESTHESIOLOGY

## 2022-11-24 RX ORDER — FENTANYL CITRATE 50 UG/ML
100 INJECTION, SOLUTION INTRAMUSCULAR; INTRAVENOUS
Status: DISCONTINUED | OUTPATIENT
Start: 2022-11-24 | End: 2022-11-24 | Stop reason: HOSPADM

## 2022-11-24 RX ORDER — OXYTOCIN/RINGER'S LACTATE 30/500 ML
10 PLASTIC BAG, INJECTION (ML) INTRAVENOUS AS NEEDED
Status: COMPLETED | OUTPATIENT
Start: 2022-11-24 | End: 2022-11-24

## 2022-11-24 RX ORDER — LIDOCAINE HYDROCHLORIDE AND EPINEPHRINE 15; 5 MG/ML; UG/ML
INJECTION, SOLUTION EPIDURAL AS NEEDED
Status: DISCONTINUED | OUTPATIENT
Start: 2022-11-24 | End: 2022-11-24 | Stop reason: HOSPADM

## 2022-11-24 RX ORDER — SODIUM CHLORIDE, SODIUM LACTATE, POTASSIUM CHLORIDE, CALCIUM CHLORIDE 600; 310; 30; 20 MG/100ML; MG/100ML; MG/100ML; MG/100ML
125 INJECTION, SOLUTION INTRAVENOUS CONTINUOUS
Status: DISCONTINUED | OUTPATIENT
Start: 2022-11-24 | End: 2022-11-25

## 2022-11-24 RX ORDER — FENTANYL/BUPIVACAINE/NS/PF 2-1250MCG
1-16 PREFILLED PUMP RESERVOIR EPIDURAL CONTINUOUS
Status: DISCONTINUED | OUTPATIENT
Start: 2022-11-24 | End: 2022-11-24 | Stop reason: HOSPADM

## 2022-11-24 RX ORDER — CALCIUM CARBONATE 200(500)MG
400 TABLET,CHEWABLE ORAL
Status: DISCONTINUED | OUTPATIENT
Start: 2022-11-24 | End: 2022-11-25

## 2022-11-24 RX ORDER — FENTANYL CITRATE 50 UG/ML
INJECTION, SOLUTION INTRAMUSCULAR; INTRAVENOUS AS NEEDED
Status: DISCONTINUED | OUTPATIENT
Start: 2022-11-24 | End: 2022-11-24 | Stop reason: HOSPADM

## 2022-11-24 RX ORDER — MISOPROSTOL 200 UG/1
800 TABLET ORAL
Status: COMPLETED | OUTPATIENT
Start: 2022-11-24 | End: 2022-11-24

## 2022-11-24 RX ORDER — BUPIVACAINE HYDROCHLORIDE 5 MG/ML
30 INJECTION, SOLUTION EPIDURAL; INTRACAUDAL AS NEEDED
Status: DISCONTINUED | OUTPATIENT
Start: 2022-11-24 | End: 2022-11-24 | Stop reason: HOSPADM

## 2022-11-24 RX ORDER — NALBUPHINE HYDROCHLORIDE 20 MG/ML
10 INJECTION, SOLUTION INTRAMUSCULAR; INTRAVENOUS; SUBCUTANEOUS
Status: DISCONTINUED | OUTPATIENT
Start: 2022-11-24 | End: 2022-11-24 | Stop reason: HOSPADM

## 2022-11-24 RX ORDER — BUPIVACAINE HYDROCHLORIDE 5 MG/ML
INJECTION, SOLUTION EPIDURAL; INTRACAUDAL AS NEEDED
Status: DISCONTINUED | OUTPATIENT
Start: 2022-11-24 | End: 2022-11-24 | Stop reason: HOSPADM

## 2022-11-24 RX ORDER — IBUPROFEN 400 MG/1
800 TABLET ORAL EVERY 8 HOURS
Status: DISCONTINUED | OUTPATIENT
Start: 2022-11-24 | End: 2022-11-26 | Stop reason: HOSPADM

## 2022-11-24 RX ORDER — FENTANYL CITRATE 50 UG/ML
100 INJECTION, SOLUTION INTRAMUSCULAR; INTRAVENOUS ONCE
Status: DISCONTINUED | OUTPATIENT
Start: 2022-11-24 | End: 2022-11-24 | Stop reason: HOSPADM

## 2022-11-24 RX ORDER — TERBUTALINE SULFATE 1 MG/ML
0.25 INJECTION SUBCUTANEOUS AS NEEDED
Status: DISCONTINUED | OUTPATIENT
Start: 2022-11-24 | End: 2022-11-24 | Stop reason: HOSPADM

## 2022-11-24 RX ORDER — OXYTOCIN/RINGER'S LACTATE 30/500 ML
87.3 PLASTIC BAG, INJECTION (ML) INTRAVENOUS AS NEEDED
Status: DISCONTINUED | OUTPATIENT
Start: 2022-11-24 | End: 2022-11-25

## 2022-11-24 RX ORDER — NALOXONE HYDROCHLORIDE 0.4 MG/ML
0.4 INJECTION, SOLUTION INTRAMUSCULAR; INTRAVENOUS; SUBCUTANEOUS AS NEEDED
Status: DISCONTINUED | OUTPATIENT
Start: 2022-11-24 | End: 2022-11-24 | Stop reason: HOSPADM

## 2022-11-24 RX ORDER — NORETHINDRONE AND ETHINYL ESTRADIOL 0.5-0.035
10 KIT ORAL ONCE
Status: DISCONTINUED | OUTPATIENT
Start: 2022-11-24 | End: 2022-11-24 | Stop reason: HOSPADM

## 2022-11-24 RX ORDER — ACETAMINOPHEN 325 MG/1
650 TABLET ORAL
Status: DISCONTINUED | OUTPATIENT
Start: 2022-11-24 | End: 2022-11-26 | Stop reason: HOSPADM

## 2022-11-24 RX ORDER — OXYTOCIN/RINGER'S LACTATE 30/500 ML
PLASTIC BAG, INJECTION (ML) INTRAVENOUS
Status: COMPLETED
Start: 2022-11-24 | End: 2022-11-24

## 2022-11-24 RX ADMIN — BUPIVACAINE HYDROCHLORIDE 2 ML: 5 INJECTION, SOLUTION EPIDURAL; INTRACAUDAL; PERINEURAL at 16:57

## 2022-11-24 RX ADMIN — Medication 10 ML/HR: at 17:21

## 2022-11-24 RX ADMIN — TERBUTALINE SULFATE 0.25 MG: 1 INJECTION SUBCUTANEOUS at 17:19

## 2022-11-24 RX ADMIN — Medication 10000 MILLI-UNITS: at 19:15

## 2022-11-24 RX ADMIN — SODIUM CHLORIDE, POTASSIUM CHLORIDE, SODIUM LACTATE AND CALCIUM CHLORIDE 1000 ML: 600; 310; 30; 20 INJECTION, SOLUTION INTRAVENOUS at 16:28

## 2022-11-24 RX ADMIN — OXYTOCIN 10000 MILLI-UNITS: 10 INJECTION, SOLUTION INTRAMUSCULAR; INTRAVENOUS at 19:15

## 2022-11-24 RX ADMIN — SODIUM CHLORIDE 5 MILLION UNITS: 900 INJECTION INTRAVENOUS at 17:17

## 2022-11-24 RX ADMIN — MISOPROSTOL 800 MCG: 200 TABLET ORAL at 19:59

## 2022-11-24 RX ADMIN — CALCIUM CARBONATE (ANTACID) CHEW TAB 500 MG 400 MG: 500 CHEW TAB at 18:20

## 2022-11-24 RX ADMIN — LIDOCAINE HYDROCHLORIDE,EPINEPHRINE BITARTRATE 5 ML: 15; .005 INJECTION, SOLUTION EPIDURAL; INFILTRATION; INTRACAUDAL; PERINEURAL at 16:57

## 2022-11-24 RX ADMIN — FENTANYL CITRATE 100 MCG: 50 INJECTION, SOLUTION INTRAMUSCULAR; INTRAVENOUS at 16:57

## 2022-11-24 RX ADMIN — SODIUM CHLORIDE, POTASSIUM CHLORIDE, SODIUM LACTATE AND CALCIUM CHLORIDE 1000 ML: 600; 310; 30; 20 INJECTION, SOLUTION INTRAVENOUS at 17:26

## 2022-11-24 NOTE — PROGRESS NOTES
1700-Report received from [address] [place] [address]    4662: Turned to L side. 1708: Turned to R side. Dr. Jennifer Sandoval called to bedside, large clot passed. 1710: Dr. Jennifer Sandoval at bedside, SVE 7-8 cm.    1711: R side. 1712: L side, trendelenburg. 1713: Knee-chest.    1714: SVE performed by Dr. Jennifer Sandoval-- /+2. Particulate meconium noted. Will have NICU at bedside at delivery. 1718: Oxygen applied. 1719: Terb given. Dr Jennifer Sandoval remains at bedside. 1736-Pt out of knee chest, turned to left side. Pads under patient changed and pericare done. 1738-Peanut ball placed between knees. 1746-Whyte placed to empty bladder and encourage descent of baby. 1757-Pt turned to right side. No vaginal bleeding noted. 1815-Pt vomiting. 1820-Pt given tums to help with heartburn. 1855-Pt feeling more pressure, Dr Jennifer Sandoval at bedside, viewed strip. SVE done. 10/100/+1.    185-Whyte removed for pushing. 300 ml of sara yellow urine. 185-Practice push, pt did well. Stopped pushing, placed up in stirrups and NICU called for delivery. -NICU at bedside for delivery. 190- of liveborn male. See delivery summary. -Placenta delivered. Will send to pathology. 193-Bedside and Verbal shift change report given to MITRA De León RN  (oncoming nurse) by Triny Messer. Franklin Helton RN  (offgoing nurse). Report included the following information SBAR.

## 2022-11-24 NOTE — PROGRESS NOTES
Labor Progress Note  Patient seen, fetal heart rate and contraction pattern evaluated, patient examined. Called to see patient because of increased bleeding and FHR deceleration. She just got epidural but blood pressure is normal.  Patient Vitals for the past 1 hrs:   BP Pulse SpO2   11/24/22 1724 138/77 81 --   11/24/22 1721 132/73 83 --   11/24/22 1719 (!) 148/65 81 --   11/24/22 1716 132/67 86 --   11/24/22 1706 (!) 107/55 82 --   11/24/22 1703 (!) 111/59 86 --   11/24/22 1700 (!) 112/57 83 100 %   11/24/22 1659 120/73 87 --   11/24/22 1657 115/68 81 --       Physical Exam:  Cervical Exam:  8/100/+2, can't quite stretch it to be complete  Membranes:  Spontaneous Rupture of Membranes; Amniotic Fluid: thick and particulate meconium fluid  Uterine Activity: q2 min  Fetal Heart Rate: Baseline: 110 per minute  Variability: moderate  Accelerations: yes  Decelerations: prolonged decel down to the 80s, recovered with position changes and O2    Assessment/Plan:  Rapid cervical change and placenta abruption, given terbutaline to help slow down ctxs a bit. FHR recovered with intrauterine resuscitation measures. I anticipate that she will deliver soon given rapid change and her being a multip.

## 2022-11-24 NOTE — ANESTHESIA PROCEDURE NOTES
Epidural Block    Patient location during procedure: OB  Start time: 11/24/2022 4:53 PM  End time: 11/24/2022 5:03 PM  Reason for block: labor epidural  Staffing  Performed: attending   Anesthesiologist: Xochilt Armenta MD  Preanesthetic Checklist  Completed: patient identified, IV checked, site marked, risks and benefits discussed, surgical consent, monitors and equipment checked, pre-op evaluation and timeout performed  Block Placement  Patient position: left lateral decubitus  Prep: ChloraPrep and DuraPrep  Sterility prep: cap, drape, gloves and mask  Sedation level: no sedation  Patient monitoring: continuous pulse oximetry and heart rate  Approach: midline  Location: lumbar  Lumbar location: L3-L4  Epidural  Loss of resistance technique: saline and air  Guidance: landmark technique  Needle  Needle type: Tuohy   Needle gauge: 17 G  Needle length: 9 cm  Needle insertion depth: 7 cm  Catheter type: end hole  Catheter size: 19 G  Catheter at skin depth: 12 cm  Catheter securement method: clear occlusive dressing, liquid medical adhesive and surgical tape  Assessment  Sensory level: T6  Block outcome: pain improved  Number of attempts: 1  Procedure assessment: patient tolerated procedure well with no immediate complications

## 2022-11-24 NOTE — ED PROVIDER NOTES
29 yo [id number] @ 39w4d, pt of [facility], brought in by [facility] because of sudden onset of vaginal bleeding. She was having back pain and then had a large gush of vaginal bleeding. Called squad and came in. Squad reports 2 large gushes of blood in route. History significant for anxiety and depression  Chlamydia this pregnancy with neg EVANS    x2 with placenta abruption with first birth  GBS positive    The history is provided by the patient. Abdominal Pain   This is a new problem. The current episode started less than 1 hour ago. Vaginal Bleeding  This is a new problem. The current episode started less than 1 hour ago. Associated symptoms include abdominal pain.         Chief Complaint   Patient presents with    Abdominal Pain    Vaginal Bleeding       Past Medical History:   Diagnosis Date    Anemia     Contact dermatitis and other eczema, due to unspecified cause     Migraines        Past Surgical History:   Procedure Laterality Date    HX OTHER SURGICAL      eye surgery as a baby    HX OTHER SURGICAL      gum surgery as a child         Family History:   Problem Relation Age of Onset    Asthma Mother     Hypertension Mother     Hypertension Father        Social History     Socioeconomic History    Marital status: SINGLE     Spouse name: Not on file    Number of children: Not on file    Years of education: Not on file    Highest education level: Not on file   Occupational History    Not on file   Tobacco Use    Smoking status: Every Day     Types: Cigars    Smokeless tobacco: Never   Substance and Sexual Activity    Alcohol use: Yes    Drug use: Not Currently     Comment: per pt    Sexual activity: Yes     Partners: Male     Birth control/protection: None   Other Topics Concern    Not on file   Social History Narrative    Not on file     Social Determinants of Health     Financial Resource Strain: Not on file   Food Insecurity: Not on file   Transportation Needs: Not on file   Physical Activity: Not on file Stress: Not on file   Social Connections: Not on file   Intimate Partner Violence: Not on file   Housing Stability: Not on file         ALLERGIES: Patient has no known allergies. Review of Systems   Gastrointestinal:  Positive for abdominal pain. Genitourinary:  Positive for vaginal bleeding. All other systems reviewed and are negative. There were no vitals filed for this visit. Physical Exam  Vitals and nursing note reviewed. Exam conducted with a chaperone present. Constitutional:       General: She is not in acute distress. Appearance: She is normal weight. She is not ill-appearing. Cardiovascular:      Rate and Rhythm: Normal rate and regular rhythm. Pulmonary:      Effort: Pulmonary effort is normal.   Abdominal:      Palpations: Abdomen is soft. Genitourinary:     Comments: Blood on perineum and dried blood all the way down her legs  Pants soaked with blood  No active bleeding  Cvx 360/-2, unclear if there is a bag present  Neurological:      Mental Status: She is alert. FHTs 145, mod variability, (+) accels, no decels, ctxs q2 min    MDM     Amount and/or Complexity of Data Reviewed  Clinical lab tests: ordered  Review and summarize past medical records: yes    Risk of Complications, Morbidity, and/or Mortality  Presenting problems: high  Diagnostic procedures: high  Management options: high              ED Course as of 22 1615   Thu 2022   1614 27 yo  @ 39+ weeks, vaginal bleeding labor. Admit for birth.  [NR]      ED Course User Index  [NR] John Osullivan MD       Procedures    [unfilled]

## 2022-11-24 NOTE — PROGRESS NOTES
1605: Patient presented to [facility] via EMS with chief complaint of bright red VB that began at 1530. Patient reports contractions every 2 minutes rated 10/10-- palpate strong. Endorses FM. Upon assessment, pants are saturated with bright red bleeding, may also be mixed with amniotic fluid but hard to tell-- patient is unsure if she's been leaking fluid. Reports contractions started about 3 hours before the bleeding began. Patient has a history of an abruption. Dr. Jose Maurer at bedside assessing patient. 1609: Cervical exam /-2. Plan to admit for labor and increased VB. Patient agreeable with plan of care. Requesting epidural.    1621: Patient moved to room 2.     1650: Dr. Dario Sandoval at bedside. 1657: Epidural placed, patient tolerated procedure well.      1700: Bedside/verbal report given to KENNETH Mares RN.

## 2022-11-24 NOTE — H&P
History & Physical    Name: Doris Alvarado MRN: [id number]  SSN: [id number]    YOB: 1995  Age: 28 y.o. Sex: female        Subjective:     Estimated Date of Delivery: 22  OB History    Para Term  AB Living   5 2 2   2 2   SAB IAB Ectopic Molar Multiple Live Births     2     0 2      # Outcome Date GA Lbr Jayro/2nd Weight Sex Delivery Anes PTL Lv   5 Current            4 Term 17 38w5d 08:50 / 01:51 2.91 kg M Vag-Spont EPIDURAL AN N DARYN      Complications: Abruptio Placenta   3 Term 16 38w4d 15:57 / 00:21 2.455 kg F VAGINAL DELI EPIDURAL AN N DARYN      Birth Comments: Born at [facility]   2 IAB            1 IAB                Ms. Zandra Sigala is a 28 y.o.  admitted from [facility] with pregnancy at 39w4d for  vaginal bleeding and labor . History significant for anxiety and depression  Chlamydia this pregnancy with neg EVANS    x2 with placenta abruption with first birth  GBS positive    Please see prenatal records for details. Past Medical History:   Diagnosis Date    Anemia     Contact dermatitis and other eczema, due to unspecified cause     Migraines      Past Surgical History:   Procedure Laterality Date    HX OTHER SURGICAL      eye surgery as a baby    HX OTHER SURGICAL      gum surgery as a child     Social History     Occupational History    Not on file   Tobacco Use    Smoking status: Every Day     Types: Cigars    Smokeless tobacco: Never   Substance and Sexual Activity    Alcohol use: Yes    Drug use: Not Currently     Comment: per pt    Sexual activity: Yes     Partners: Male     Birth control/protection: None     Family History   Problem Relation Age of Onset    Asthma Mother     Hypertension Mother     Hypertension Father        No Known Allergies  Prior to Admission medications    Medication Sig Start Date End Date Taking? Authorizing Provider   famotidine (Pepcid AC) 10 mg tablet Take 10 mg by mouth two (2) times a day.     Provider, Historical ferrous sulfate (SLOW FE PO) Take  by mouth. Provider, Historical   ondansetron (ZOFRAN ODT) 4 mg disintegrating tablet Take 1 Tablet by mouth every eight (8) hours as needed for Nausea or Vomiting. 22   ELADIO Garcia   prenatal vit-iron fumarate-fa 27 mg iron- 0.8 mg tab tablet Take 1 Tablet by mouth daily. 22   ELADIO Garcia   cephALEXin (KEFLEX) 500 mg capsule  21   Provider, Historical   butalbital-acetaminophen-caffeine (FIORICET, ESGIC) -40 mg per tablet  21   Provider, Historical   naproxen (NAPROSYN) 500 mg tablet  21   Provider, Historical   methylPREDNISolone (MEDROL DOSEPACK) 4 mg tablet Use per dose pack 21   Ella TORRES NP   tiZANidine (ZANAFLEX) 4 mg tablet Take 1 Tablet by mouth every six (6) hours. 21   Mariola Beckford NP   ergocalciferol (ERGOCALCIFEROL) 50,000 unit capsule Take 1 Cap by mouth every seven (7) days. Patient not taking: Reported on 2021   Mariola Beckford NP   ibuprofen (MOTRIN) 600 mg tablet Take 1 Tab by mouth every six (6) hours as needed for Pain. Patient not taking: Reported on 2021   Rj Teixeira MD        Review of Systems: A comprehensive review of systems was negative except for that written in the HPI. Objective:     Vitals: There were no vitals filed for this visit. Physical Exam:  Patient without distress.   Heart: Regular rate and rhythm  Lung: normal respiratory effort  Abdomen: soft, nontender  Perineum: blood present, amniotic fluid absent  Cervical Exam: 3/60/-2  Membranes:   unclear  Fetal Heart Rate: Baseline: 145 per minute  Variability: moderate  Accelerations: yes  Decelerations: none  Uterine contractions: q2min        Assessment/Plan:     29 yo  @ 39w4d  Labor  Vaginal bleeding, likely placenta abruption  T&S, CBC, coags  Currently reassuring fetal status, if fetal status deteriorates or bleeding is substantial will proceed with     GBS positive --> PCN  Pt desires epidural  I reviewed plan of care with patient and partner, they understand and agree  All questions answered

## 2022-11-25 PROCEDURE — 65410000002 HC RM PRIVATE OB

## 2022-11-25 PROCEDURE — 74011250637 HC RX REV CODE- 250/637: Performed by: OBSTETRICS & GYNECOLOGY

## 2022-11-25 RX ORDER — DOCUSATE SODIUM 100 MG/1
100 CAPSULE, LIQUID FILLED ORAL
Status: DISCONTINUED | OUTPATIENT
Start: 2022-11-25 | End: 2022-11-26 | Stop reason: HOSPADM

## 2022-11-25 RX ORDER — AMMONIA 15 % (W/V)
1 AMPUL (EA) INHALATION AS NEEDED
Status: DISCONTINUED | OUTPATIENT
Start: 2022-11-25 | End: 2022-11-26 | Stop reason: HOSPADM

## 2022-11-25 RX ORDER — HYDROCORTISONE 1 %
CREAM (GRAM) TOPICAL AS NEEDED
Status: DISCONTINUED | OUTPATIENT
Start: 2022-11-25 | End: 2022-11-26 | Stop reason: HOSPADM

## 2022-11-25 RX ORDER — OXYTOCIN/RINGER'S LACTATE 30/500 ML
10 PLASTIC BAG, INJECTION (ML) INTRAVENOUS AS NEEDED
Status: DISCONTINUED | OUTPATIENT
Start: 2022-11-25 | End: 2022-11-26 | Stop reason: HOSPADM

## 2022-11-25 RX ORDER — DIPHENHYDRAMINE HCL 25 MG
25 CAPSULE ORAL
Status: DISCONTINUED | OUTPATIENT
Start: 2022-11-25 | End: 2022-11-26 | Stop reason: HOSPADM

## 2022-11-25 RX ORDER — LANOLIN ALCOHOL/MO/W.PET/CERES
3 CREAM (GRAM) TOPICAL
Status: DISCONTINUED | OUTPATIENT
Start: 2022-11-25 | End: 2022-11-26 | Stop reason: HOSPADM

## 2022-11-25 RX ORDER — OXYCODONE AND ACETAMINOPHEN 5; 325 MG/1; MG/1
1 TABLET ORAL
Status: DISCONTINUED | OUTPATIENT
Start: 2022-11-25 | End: 2022-11-26 | Stop reason: HOSPADM

## 2022-11-25 RX ORDER — HYDROCORTISONE ACETATE PRAMOXINE HCL 2.5; 1 G/100G; G/100G
CREAM TOPICAL AS NEEDED
Status: DISCONTINUED | OUTPATIENT
Start: 2022-11-25 | End: 2022-11-26 | Stop reason: HOSPADM

## 2022-11-25 RX ORDER — ONDANSETRON 4 MG/1
4 TABLET, ORALLY DISINTEGRATING ORAL
Status: DISCONTINUED | OUTPATIENT
Start: 2022-11-25 | End: 2022-11-26 | Stop reason: HOSPADM

## 2022-11-25 RX ORDER — SODIUM CHLORIDE 0.9 % (FLUSH) 0.9 %
5-40 SYRINGE (ML) INJECTION AS NEEDED
Status: DISCONTINUED | OUTPATIENT
Start: 2022-11-25 | End: 2022-11-26 | Stop reason: HOSPADM

## 2022-11-25 RX ORDER — SODIUM CHLORIDE 0.9 % (FLUSH) 0.9 %
5-40 SYRINGE (ML) INJECTION EVERY 8 HOURS
Status: DISCONTINUED | OUTPATIENT
Start: 2022-11-25 | End: 2022-11-26 | Stop reason: HOSPADM

## 2022-11-25 RX ORDER — SIMETHICONE 80 MG
80 TABLET,CHEWABLE ORAL
Status: DISCONTINUED | OUTPATIENT
Start: 2022-11-25 | End: 2022-11-26 | Stop reason: HOSPADM

## 2022-11-25 RX ORDER — OXYTOCIN/RINGER'S LACTATE 30/500 ML
87.3 PLASTIC BAG, INJECTION (ML) INTRAVENOUS AS NEEDED
Status: DISCONTINUED | OUTPATIENT
Start: 2022-11-25 | End: 2022-11-26 | Stop reason: HOSPADM

## 2022-11-25 RX ADMIN — IBUPROFEN 800 MG: 400 TABLET, FILM COATED ORAL at 16:16

## 2022-11-25 RX ADMIN — OXYCODONE AND ACETAMINOPHEN 1 TABLET: 5; 325 TABLET ORAL at 07:55

## 2022-11-25 RX ADMIN — OXYCODONE AND ACETAMINOPHEN 1 TABLET: 5; 325 TABLET ORAL at 20:10

## 2022-11-25 RX ADMIN — IBUPROFEN 800 MG: 400 TABLET, FILM COATED ORAL at 01:54

## 2022-11-25 RX ADMIN — OXYCODONE AND ACETAMINOPHEN 1 TABLET: 5; 325 TABLET ORAL at 12:08

## 2022-11-25 RX ADMIN — ALUMINUM HYDROXIDE AND MAGNESIUM HYDROXIDE 30 ML: 200; 200 SUSPENSION ORAL at 05:54

## 2022-11-25 RX ADMIN — OXYCODONE AND ACETAMINOPHEN 1 TABLET: 5; 325 TABLET ORAL at 16:16

## 2022-11-25 RX ADMIN — OXYCODONE AND ACETAMINOPHEN 1 TABLET: 5; 325 TABLET ORAL at 01:58

## 2022-11-25 RX ADMIN — IBUPROFEN 800 MG: 400 TABLET, FILM COATED ORAL at 09:28

## 2022-11-25 NOTE — DISCHARGE INSTRUCTIONS
Postpartum Support Groups   We know that all of us are dealing with a tremendous amount of uncertainty, confusion and disruption to our daily lives, which may result in increased anxiety, depression and fear. If you are feeling unsettled or worse, please know that we are here to help. During this time of increased caution and care for one another, Postpartum [facility] [place] ([employer]) is offering virtual and in person support groups to ALL MOTHERS in [place] regardless of the age of your child/children as a way to help weather this emotional storm together. Social support is an important part of self-care during this time of physical distancing. Virtual postpartum support group meetings available at www. postpartumva.org  Warm Line: 623.988.8494    Breastfeeding Support Groups    and  of each month at [place] [address]   and  of each month at [facility] (in education center behind [employer])    [place] at www.[employer] under the \"About Us\" and \"Classes and Events tabs\"       After Your Delivery (the Postpartum Period): Care Instructions  Overview     Congratulations on the birth of your baby. Like pregnancy, the  period can be a time of excitement, alicia, and exhaustion. You may look at your wondrous little baby and feel happy. You may also be overwhelmed by your new sleep hours and new responsibilities. At first, babies often sleep during the days and are awake at night. They do not have a pattern or routine. They may make sudden gasps, jerk themselves awake, or look like they have crossed eyes. These are all normal, and they may even make you smile. In these first weeks after delivery, try to take good care of yourself. It may take 4 to 6 weeks to feel like yourself again, and possibly longer if you had a  birth. You will likely feel very tired for several weeks. Your days will be full of ups and downs, but lots of alicia as well.   Follow-up care is a key part of your treatment and safety. Be sure to make and go to all appointments, and call your doctor if you are having problems. It's also a good idea to know your test results and keep a list of the medicines you take. How can you care for yourself at home? Take care of your body after delivery  Use pads instead of tampons for the bloody flow that may last as long as 2 weeks. Ease cramps with ibuprofen (Advil, Motrin). Ease soreness of hemorrhoids and the area between your vagina and rectum with ice compresses or witch hazel pads. Ease constipation by drinking lots of fluid and eating high-fiber foods. Ask your doctor about over-the-counter stool softeners. Cleanse yourself with a gentle squeeze of warm water from a bottle instead of wiping with toilet paper. Take a sitz bath in warm water several times a day. Wear a good nursing bra. Ease sore and swollen breasts with warm, wet washcloths. If you aren't breastfeeding, use ice rather than heat for breast soreness. Your period may not start for several months if you are breastfeeding. You may bleed more, and longer at first, than you did before you got pregnant. Wait until you are healed (about 4 to 6 weeks) before you have sex. Ask your doctor when it is okay for you to have sex. Try not to travel with your baby for 5 or 6 weeks. If you take a long car trip, make frequent stops to walk around and stretch. Avoid exhaustion  Rest every day. Try to nap when your baby naps. Ask another adult to be with you for a few days after delivery. Plan for  if you have other children. Stay flexible so you can eat at odd hours and sleep when you need to. Both you and your baby are making new schedules. Plan small trips to get out of the house. Change can make you feel less tired. Ask for help with housework, cooking, and shopping. Remind yourself that your job is to care for your baby.   Know about help for postpartum depression  \"Baby blues\" are common for the first 1 to 2 weeks after birth. You may cry or feel sad or irritable for no reason. Rest whenever you can. Being tired makes it harder to handle your emotions. Go for walks with your baby. Talk to your partner, friends, and family about your feelings. If your symptoms last for more than a few weeks, or if you feel very depressed, ask your doctor for help. Postpartum depression can be treated. Support groups and counseling can help. Sometimes medicine can also help. Stay healthy  Eat healthy foods so you have more energy. If you breastfeed, avoid drugs. If you quit smoking during pregnancy, try to stay smoke-free. If you choose to have a drink now and then, have only one drink, and limit the number of occasions that you have a drink. Wait to breastfeed at least 2 hours after you have a drink to reduce the amount of alcohol the baby may get in the milk. Start daily exercise after 4 to 6 weeks, but rest when you feel tired. Learn exercises to tone your belly. Try Kegel exercises to regain strength in your pelvic muscles. You can do these exercises while you stand or sit. (If doing these exercises causes pain, stop doing them and talk with your doctor.)  Squeeze your muscles as if you were trying not to pass gas. Or squeeze your muscles as if you were stopping the flow of urine. Your belly, legs, and buttocks shouldn't move. Hold the squeeze for 3 seconds, then relax for 5 to 10 seconds. Start with 3 seconds, then add 1 second each week until you are able to squeeze for 10 seconds. Repeat the exercise 10 times a session. Do 3 to 8 sessions a day. Find a class for you and your baby that has an exercise time. If you had a  birth, give yourself a bit more time before you exercise, and be careful. When should you call for help? Share this information with your partner, family, or a friend. They can help you watch for warning signs.   Call 911  anytime you think you may need emergency care. For example, call if:    You have thoughts of harming yourself, your baby, or another person. You passed out (lost consciousness). You have chest pain, are short of breath, or cough up blood. You have a seizure. Call your doctor now or seek immediate medical care if:    You have signs of hemorrhage (too much bleeding), such as:  Heavy vaginal bleeding. This means that you are soaking through one or more pads in an hour. Or you pass blood clots bigger than an egg. Feeling dizzy or lightheaded, or you feel like you may faint. Feeling so tired or weak that you cannot do your usual activities. A fast or irregular heartbeat. New or worse belly pain. You have signs of infection, such as:  A fever. Vaginal discharge that smells bad. New or worse belly pain. You have symptoms of a blood clot in your leg (called a deep vein thrombosis), such as:  Pain in the calf, back of the knee, thigh, or groin. Redness and swelling in your leg or groin. You have signs of preeclampsia, such as:  Sudden swelling of your face, hands, or feet. New vision problems (such as dimness, blurring, or seeing spots). A severe headache. Watch closely for changes in your health, and be sure to contact your doctor if:    Your vaginal bleeding isn't decreasing. You feel sad, anxious, or hopeless for more than a few days. You are having problems with your breasts or breastfeeding. Where can you learn more? Go to http://www.[employer].com/  Enter A461 in the search box to learn more about \"After Your Delivery (the Postpartum Period): Care Instructions. \"  Current as of: February 23, 2022               Content Version: 13.4  © 6674-0577 [employer]. Care instructions adapted under license by [employer] (which disclaims liability or warranty for this information).  If you have questions about a medical condition or this instruction, always ask your healthcare professional. [address] any warranty or liability for your use of this information.

## 2022-11-25 NOTE — ROUTINE PROCESS
Bedside shift change report given to LING Cole (oncoming nurse) by Daniel Mendoza RN (offgoing nurse). Report included the following information SBAR.

## 2022-11-25 NOTE — ANESTHESIA POSTPROCEDURE EVALUATION
* No procedures listed *.    epidural    Anesthesia Post Evaluation      Multimodal analgesia: multimodal analgesia not used between 6 hours prior to anesthesia start to PACU discharge  Patient location during evaluation: floor  Level of consciousness: awake  Pain score: 0  Pain management: adequate  Airway patency: patent  Anesthetic complications: no  Cardiovascular status: acceptable  Respiratory status: acceptable  Hydration status: acceptable  Post anesthesia nausea and vomiting:  none  Final Post Anesthesia Temperature Assessment:  Normothermia (36.0-37.5 degrees C)      INITIAL Post-op Vital signs:   Vitals Value Taken Time   /75 11/24/22 2017   Temp     Pulse 84 11/24/22 2017   Resp     SpO2     Vitals shown include unvalidated device data.

## 2022-11-25 NOTE — PROGRESS NOTES
Post-Partum Day Number 1 Progress Note    Katie Franks     Assessment: Doing well, post partum day 1    Plan:  - Continue routine postpartum and perineal care as well as maternal education.  - N/A - declines circumcision  - Plan discharge home [facility] Discharge Date: Tomorrow. - hx depression/anxiety -no current meds, early follow up    Information for the patient's :  Travis Thompson [[id number]]   Vaginal, Spontaneous  Patient doing well without significant complaint. Voiding without difficulty, normal lochia. Vitals:  Visit Vitals  /80 (BP 1 Location: Right arm, BP Patient Position: At rest)   Pulse 80   Temp 97.8 °F (36.6 °C)   Resp 16   Ht 5' 3\" (1.6 m)   Wt 68 kg (149 lb 14.6 oz)   SpO2 98%   Breastfeeding Unknown   BMI 26.56 kg/m²     Temp (24hrs), Av.1 °F (36.7 °C), Min:97.5 °F (36.4 °C), Max:99.2 °F (37.3 °C)        Exam:   Patient without distress. Fundus firm, nontender                  Perineum with normal lochia noted per nursing assessment. Lower extremities are negative for pathological edema.     Labs:     Lab Results   Component Value Date/Time    WBC 10.3 2022 04:36 PM    WBC 10.1 2022 09:07 PM    WBC 5.3 2019 09:36 AM    WBC 6.6 2017 02:32 AM    WBC 7.0 2016 10:25 AM    HGB 11.5 2022 04:36 PM    HGB 13.7 2022 09:07 PM    HGB 13.5 2019 09:36 AM    HGB 10.1 (L) 2017 02:32 AM    HGB 12.6 2016 10:25 AM    HCT 33.8 (L) 2022 04:36 PM    HCT 39.6 2022 09:07 PM    HCT 38.7 2019 09:36 AM    HCT 30.1 (L) 2017 02:32 AM    HCT 37.0 2016 10:25 AM    PLATELET 776  04:36 PM    PLATELET 805  09:07 PM    PLATELET 966  09:36 AM    PLATELET 835 10/70/5253 02:32 AM    PLATELET 331  10:25 AM       Recent Results (from the past 24 hour(s))   CBC W/O DIFF    Collection Time: 22  4:36 PM   Result Value Ref Range    WBC 10.3 3.6 - 11.0 K/uL    RBC 3.52 (L) 3.80 - 5.20 M/uL    HGB 11.5 11.5 - 16.0 g/dL    HCT 33.8 (L) 35.0 - 47.0 %    MCV 96.0 80.0 - 99.0 FL    MCH 32.7 26.0 - 34.0 PG    MCHC 34.0 30.0 - 36.5 g/dL    RDW 13.6 11.5 - 14.5 %    PLATELET 825 099 - 157 K/uL    MPV 10.4 8.9 - 12.9 FL    NRBC 0.0 0  WBC    ABSOLUTE NRBC 0.00 0.00 - 0.01 K/uL   COAGULATION SCREEN    Collection Time: 11/24/22  4:36 PM   Result Value Ref Range    Fibrinogen 420 200 - 475 mg/dL    INR 0.9 0.9 - 1.1      Prothrombin time 9.7 9.0 - 11.1 sec    aPTT 28.2 22.1 - 31.0 sec    aPTT, therapeutic range     58.0 - 77.0 SECS

## 2022-11-25 NOTE — PROGRESS NOTES
Bedside and Verbal shift change report given to MITRA Stockton RN (oncoming nurse) by Agnieszka Hutchinson. Saumya Reyna RN (offgoing nurse). Report included the following information SBAR, Kardex, Intake/Output, MAR, Accordion, and Recent Results. 1946- Moderate bleeding small clots, fundus Alba [address] Dr. Otilio Javier on pt status, orders received for one time dose of 800mg cytotec. 65- cytotec given. 2005- Fundus firm at umbilicus, small rubra. 2050- fundus firm above umbilicus +2, small rubra, up to void on bedside commode. 2105- Fundus firm at umbilicus, small rubra. 2130- TRANSFER - OUT REPORT:    Verbal report given to Sonny Kiser RN(name) on Pat Archer  being transferred to MIU(unit) for routine progression of care       Report consisted of patients Situation, Background, Assessment and   Recommendations(SBAR). Information from the following report(s) SBAR, Kardex, Intake/Output, MAR, Accordion, and Recent Results was reviewed with the receiving nurse. Lines:       Opportunity for questions and clarification was provided.       Patient transported with:   Registered Nurse

## 2022-11-25 NOTE — L&D DELIVERY NOTE
Delivery Note    Obstetrician:  Garry Baez MD    Pre-Delivery Diagnosis: Term pregnancy, Spontaneous labor, and suspected placenta abruption    Post-Delivery Diagnosis: Living  infant(s) and Male    Intrapartum Event: Abruption    Procedure: Spontaneous vaginal delivery    Rizwanisha pushed for just a short time, about 15 minutes. Baby boy delivered in [id number] [address] presentation. Shoulders and body easily followed. Baby placed on mom's chest. He had a strong heartbeat and did cry but respiratory effort was intermittent so cord clamped and cut by FOB at about a minute and baby given to waiting NICU team. Placenta delivered spontaneously and intact. No lacerations. Epidural: YES    Estimated Blood Loss: 400 cc    Episiotomy: none    Laceration(s):  none    Laceration(s) repair: NO    Presentation: Cephalic    Fetal Description: guerra    Fetal Position: Occiput Anterior    Apgar - One Minute: 6    Apgar - Five Minutes: 8    Umbilical Cord: 3 vessels present  Specimens: placenta  Complications:  none           Cord Blood Results:   Information for the patient's :  Princess Barnes [[id number]]   No results found for: PCTABR, PCTDIG, BILI, ABORH   Prenatal Labs:     Lab Results   Component Value Date/Time    ABO/Rh(D) B POSITIVE 2017 02:32 AM    HBsAg, External negative 2022 12:00 AM    HIV, External non reactive 2022 12:00 AM    Rubella, External immune 2022 12:00 AM    RPR, External non reactive 2015 12:00 AM    Gonorrhea, External negative 2022 12:00 AM    Chlamydia, External negative 2022 12:00 AM    GrBStrep, External Positive 10/31/2022 12:00 AM    ABO,Rh B positive 2022 12:00 AM           Delivery Summary    Patient: Blank Alvarez MRN: [id number]  SSN: [id number]    YOB: 1995  Age: 28 y.o.   Sex: female       Information for the patient's :  Princess Barnes [[id number]]     Labor Events:    Labor: No    Steroids: None   Cervical Ripening Date/Time:       Cervical Ripening Type:     Antibiotics During Labor:     Rupture Identifier:      Rupture Date/Time: 2022 5:24 PM   Rupture Type: SROM   Amniotic Fluid Volume:      Amniotic Fluid Description: Meconium    Amniotic Fluid Odor:      Induction:         Induction Date/Time:        Indications for Induction:      Augmentation:     Augmentation Date/Time:      Indications for Augmentation:     Labor complications: Abruptio Placenta       Additional complications:        Delivery Events:  Indications For Episiotomy:     Episiotomy: None   Perineal Laceration(s): None   Repaired:     Periurethral Laceration Location:      Repaired:     Labial Laceration Location:     Repaired:     Sulcal Laceration Location:     Repaired:     Vaginal Laceration Location:     Repaired:     Cervical Laceration Location:     Repaired:     Repair Suture: None   Number of Repair Packets:     Estimated Blood Loss (ml):  ml   Quantitative Blood Loss (ml)                Delivery Date: 2022    Delivery Time: 7:09 PM  Delivery Type: Vaginal, Spontaneous  Sex:  Male    Gestational Age: 43w3d   Delivery Clinician:  Lesa Felty Rankins  Living Status: Living   Delivery Location: L&D            APGARS  One minute Five minutes Ten minutes   Skin color: 0   0        Heart rate: 2   2        Grimace: 1   2        Muscle tone: 1   2        Breathin   2        Totals: 6   8            Presentation: Vertex    Position:        Resuscitation Method:  Suctioning-bulb;Suctioning-deep;C-PAP     Meconium Stained: Thick      Cord Information:    Complications: None  Cord around:    Delayed cord clamping?  Yes  Cord clamped date/time:2022  7:09 PM  Disposition of Cord Blood: Discard    Blood Gases Sent?: Yes    Placenta:  Date/Time: 2022  7:14 PM  Removal: Spontaneous      Appearance: Other (comment)      Measurements:  Birth Weight: 2.82 kg      Birth Length: 50.8 cm      Head Circumference: 33.5 cm      Chest Circumference:       Abdominal Girth: 30 cm    Other Providers:   ELANA Pelletier;MEGHANA WEAVER;ALMA ROSA VILLASENOR;LEWIS JOHNSON;OMAR COLEY;;;;;;Elvira LO, Obstetrician;Primary Nurse;Primary [place] Nurse;Nicu Nurse;Neonatologist;Anesthesiologist;Crna;Nurse Practitioner;Midwife;Nursery Nurse;Respiratory Therapist         Group B Strep:   Lab Results   Component Value Date/Time    GrBStrep, External Positive 10/31/2022 12:00 AM     Information for the patient's :  Burak Bland [[id number]]   No results found for: ABORH, PCTABR, PCTDIG, BILI, ABORHEXT, ABORH   No results for input(s): PCO2CB, PO2CB, HCO3I, SO2I, IBD, PTEMPI, SPECTI, PHICB, ISITE, IDEV, IALLEN in the last 72 hours.

## 2022-11-25 NOTE — PROGRESS NOTES
TRANSFER - IN REPORT:    Verbal report received from 2 Bismark Vasquez RN(name) on Doris Alvarado  being received from L&D(unit) for routine progression of care      Report consisted of patients Situation, Background, Assessment and   Recommendations(SBAR). Information from the following report(s) SBAR was reviewed with the receiving nurse. Opportunity for questions and clarification was provided. Assessment completed upon patients arrival to unit and care assumed.

## 2022-11-26 VITALS
OXYGEN SATURATION: 100 % | HEIGHT: 63 IN | SYSTOLIC BLOOD PRESSURE: 126 MMHG | DIASTOLIC BLOOD PRESSURE: 74 MMHG | TEMPERATURE: 98 F | RESPIRATION RATE: 16 BRPM | WEIGHT: 149.91 LBS | HEART RATE: 78 BPM | BODY MASS INDEX: 26.56 KG/M2

## 2022-11-26 PROCEDURE — 74011250637 HC RX REV CODE- 250/637: Performed by: OBSTETRICS & GYNECOLOGY

## 2022-11-26 RX ORDER — IBUPROFEN 800 MG/1
800 TABLET ORAL
Qty: 30 TABLET | Refills: 0 | Status: SHIPPED | OUTPATIENT
Start: 2022-11-26

## 2022-11-26 RX ADMIN — OXYCODONE AND ACETAMINOPHEN 1 TABLET: 5; 325 TABLET ORAL at 14:50

## 2022-11-26 RX ADMIN — IBUPROFEN 800 MG: 400 TABLET, FILM COATED ORAL at 10:36

## 2022-11-26 RX ADMIN — IBUPROFEN 800 MG: 400 TABLET, FILM COATED ORAL at 01:49

## 2022-11-26 RX ADMIN — OXYCODONE AND ACETAMINOPHEN 1 TABLET: 5; 325 TABLET ORAL at 01:49

## 2022-11-26 RX ADMIN — OXYCODONE AND ACETAMINOPHEN 1 TABLET: 5; 325 TABLET ORAL at 10:36

## 2022-11-26 NOTE — PROGRESS NOTES
Post-Partum Day Number 2 Progress Note    Katie Franks     Assessment: Doing well, post partum day 2    Plan:   - Discharge home today  - Follow up in office in 2 week(s) with [facility].  - Pain medication prescription(s) sent. - Questions answered. Information for the patient's :  Princess Barnes [[id number]]   Vaginal, Spontaneous  Patient doing well without significant complaint. Voiding without difficulty, normal lochia. Ready for discharge home. Vitals:  Visit Vitals  /67 (BP 1 Location: Right upper arm, BP Patient Position: Semi fowlers)   Pulse 73   Temp 97.9 °F (36.6 °C)   Resp 14   Ht 5' 3\" (1.6 m)   Wt 68 kg (149 lb 14.6 oz)   SpO2 98%   Breastfeeding Unknown   BMI 26.56 kg/m²     Temp (24hrs), Av.1 °F (36.7 °C), Min:97.8 °F (36.6 °C), Max:98.5 °F (36.9 °C)      Exam:        Patient without distress. Fundus firm, nontender                 Perineum with normal lochia noted per nursing assessment                Lower extremities are negative for pathological edema    Labs:     Lab Results   Component Value Date/Time    WBC 10.3 2022 04:36 PM    WBC 10.1 2022 09:07 PM    WBC 5.3 2019 09:36 AM    WBC 6.6 2017 02:32 AM    WBC 7.0 2016 10:25 AM    HGB 11.5 2022 04:36 PM    HGB 13.7 2022 09:07 PM    HGB 13.5 2019 09:36 AM    HGB 10.1 (L) 2017 02:32 AM    HGB 12.6 2016 10:25 AM    HCT 33.8 (L) 2022 04:36 PM    HCT 39.6 2022 09:07 PM    HCT 38.7 2019 09:36 AM    HCT 30.1 (L) 2017 02:32 AM    HCT 37.0 2016 10:25 AM    PLATELET 955  04:36 PM    PLATELET 597  09:07 PM    PLATELET 865  09:36 AM    PLATELET 264  02:32 AM    PLATELET 640  10:25 AM       No results found for this or any previous visit (from the past 24 hour(s)).

## 2022-11-26 NOTE — DISCHARGE SUMMARY
Obstetrical Discharge Summary     Name: Pat Archer MRN: [id number]  SSN: [id number]    YOB: 1995  Age: 28 y.o. Sex: female      Admit Date: 2022    Discharge Date: 2022     Admitting Physician: Dayana Leiva MD     Attending Physician:  Joshua Vasquez MD     Admission Diagnoses: 39 weeks gestation of pregnancy [Z3A.39]    Discharge Diagnoses:   Information for the patient's :  Everardo Schmitt [[id number]]   Delivery of a 2.82 kg male infant via Vaginal, Spontaneous on 2022 at 7:09 PM  by Renetta Drum. Apgars were 6  and 8 . Additional Diagnoses:   Hospital Problems  Date Reviewed: 2019            Codes Class Noted POA    39 weeks gestation of pregnancy ICD-10-CM: Z3A.39  ICD-9-CM: V22.2  2022 Unknown          Lab Results   Component Value Date/Time    Rubella, External immune 2022 12:00 AM    GrBStrep, External Positive 10/31/2022 12:00 AM       Hospital Course: Normal hospital course following the delivery. Patient Instructions:   Current Discharge Medication List        CONTINUE these medications which have CHANGED    Details   ibuprofen (MOTRIN) 800 mg tablet Take 1 Tablet by mouth every eight (8) hours as needed for Pain. Qty: 30 Tablet, Refills: 0           CONTINUE these medications which have NOT CHANGED    Details   prenatal vit-iron fumarate-fa 27 mg iron- 0.8 mg tab tablet Take 1 Tablet by mouth daily.   Qty: 30 Tablet, Refills: 0           STOP taking these medications       famotidine (PEPCID) 10 mg tablet Comments:   Reason for Stopping:         ferrous sulfate (SLOW FE PO) Comments:   Reason for Stopping:         ondansetron (ZOFRAN ODT) 4 mg disintegrating tablet Comments:   Reason for Stopping:         cephALEXin (KEFLEX) 500 mg capsule Comments:   Reason for Stopping:         butalbital-acetaminophen-caffeine (FIORICET, ESGIC) -40 mg per tablet Comments:   Reason for Stopping:         naproxen (NAPROSYN) 500 mg tablet Comments:   Reason for Stopping:         methylPREDNISolone (MEDROL DOSEPACK) 4 mg tablet Comments:   Reason for Stopping:         tiZANidine (ZANAFLEX) 4 mg tablet Comments:   Reason for Stopping:         ergocalciferol (ERGOCALCIFEROL) 50,000 unit capsule Comments:   Reason for Stopping:               Disposition at Discharge: Home or self care    Condition at Discharge: Stable    Reference my discharge instructions. Follow-up Appointments   Procedures    FOLLOW UP VISIT Appointment in: 6 Weeks     Standing Status:   Standing     Number of Occurrences:   1     Order Specific Question:   Appointment in     Answer:   6 Weeks        Signed By:  Nancy Castro MD     November 26, 2022                      .

## 2022-11-26 NOTE — ROUTINE PROCESS
Bedside and Verbal shift change report given to Tien Amezcua RN (oncoming nurse) by Troy Timmons (offgoing nurse). Report included the following information SBAR, Procedure Summary, Intake/Output, and MAR.

## 2023-03-08 ENCOUNTER — VIRTUAL VISIT (OUTPATIENT)
Dept: INTERNAL MEDICINE CLINIC | Age: 28
End: 2023-03-08

## 2023-03-08 DIAGNOSIS — S43.004A DISLOCATION OF RIGHT SHOULDER JOINT, INITIAL ENCOUNTER: Primary | ICD-10-CM

## 2023-03-08 DIAGNOSIS — K64.9 HEMORRHOIDS, UNSPECIFIED HEMORRHOID TYPE: ICD-10-CM

## 2023-03-08 DIAGNOSIS — N76.0 ACUTE VAGINITIS: ICD-10-CM

## 2023-03-08 RX ORDER — FLUCONAZOLE 150 MG/1
150 TABLET ORAL ONCE
Qty: 2 TABLET | Refills: 0 | Status: SHIPPED | OUTPATIENT
Start: 2023-03-08 | End: 2023-03-08

## 2023-03-08 RX ORDER — HYDROCORTISONE 25 MG/G
CREAM TOPICAL 4 TIMES DAILY
Qty: 60 G | Refills: 0 | Status: SHIPPED | OUTPATIENT
Start: 2023-03-08

## 2023-03-08 NOTE — PROGRESS NOTES
Pipo Rubio is a 28 y.o. female    Chief Complaint   Patient presents with    Shoulder Pain     Right shoulder pain since last April 2022 and feels like shoulder is popping in and out of socket       There were no vitals taken for this visit. 1. Have you been to the ER, urgent care clinic since your last visit? Hospitalized since your last visit? NO    2. Have you seen or consulted any other health care providers outside of the [address] [place] [address] since your last visit? Include any pap smears or colon screening.  NO

## 2023-03-09 NOTE — PROGRESS NOTES
Toshia Dalton is a 28 y.o. female. \  Leanne Silva, who was evaluated through a synchronous (real-time) audio-video encounter, and/or her healthcare decision maker, is aware that it is a billable service, which includes applicable co-pays, with coverage as determined by her insurance carrier. She provided verbal consent to proceed and patient identification was verified. This visit was conducted pursuant to the emergency declaration under the [facility], [address] [place] [address] and the [employer] Act. A caregiver was present when appropriate. Ability to conduct physical exam was limited. The patient was located at: Home: [address]  The provider was located at: Facility (Appt Department): [place] [id number]    --Augustin French NP on 3/9/2023 at 3:39 AM             HPI\  Chief Complaint   Patient presents with    Shoulder Pain     Right shoulder pain since last April 2022 and feels like shoulder is popping in and out of socket      Right shoulder pain and \"popping out of place\" since April when she slept with arm above head   She fell on shoulder in 2022  She did not seek medical attention   Pain gets more severe each time should pops   Had baby on Thanksgiving day 2022, not breastfeeding     She has external vaginal irritation and mild dysuria , believe she has a yeast infection     Hemorrhoids since 15years old.  Recent flare, could not sit d/t pain  She was asymptomatic for 4 years until 1 week ago  Unsure if bleeding, because she is on cycle   Past Medical History:   Diagnosis Date    Anemia     Contact dermatitis and other eczema, due to unspecified cause     Migraines         No Known Allergies     Past Surgical History:   Procedure Laterality Date    HX OTHER SURGICAL      eye surgery as a baby    HX OTHER SURGICAL      gum surgery as a child Current Outpatient Medications   Medication Sig    hydrocortisone (ANUSOL-HC) 2.5 % rectal cream Insert  into rectum four (4) times daily. ibuprofen (MOTRIN) 800 mg tablet Take 1 Tablet by mouth every eight (8) hours as needed for Pain.    prenatal vit-iron fumarate-fa 27 mg iron- 0.8 mg tab tablet Take 1 Tablet by mouth daily. No current facility-administered medications for this visit. Review of Systems   Constitutional: Negative. Respiratory: Negative. Cardiovascular: Negative. Gastrointestinal: Negative. Genitourinary:  Positive for dysuria. Musculoskeletal:  Positive for joint pain and myalgias. Neurological: Negative. Objective  Physical Exam  Objective:   Vital Signs: (As obtained by patient/caregiver at home)  There were no vitals taken for this visit.      [INSTRUCTIONS:  \"[x]\" Indicates a positive item  \"[]\" Indicates a negative item  -- DELETE ALL ITEMS NOT EXAMINED]    Constitutional: [x] Appears well-developed and well-nourished [x] No apparent distress      [] Abnormal -     Mental status: [x] Alert and awake  [x] Oriented to person/place/time [x] Able to follow commands    [] Abnormal -     Eyes:   EOM    [x]  Normal    [] Abnormal -   Sclera  [x]  Normal    [] Abnormal -          Discharge [x]  None visible   [] Abnormal -     HENT: [x] Normocephalic, atraumatic  [] Abnormal -   [x] Mouth/Throat: Mucous membranes are moist    External Ears [x] Normal  [] Abnormal -    Neck: [x] No visualized mass [] Abnormal -     Pulmonary/Chest: [x] Respiratory effort normal   [x] No visualized signs of difficulty breathing or respiratory distress        [] Abnormal -      Musculoskeletal:   [x] Normal gait with no signs of ataxia         [x] Normal range of motion of neck        [] Abnormal -     Neurological:        [x] No Facial Asymmetry (Cranial nerve 7 motor function) (limited exam due to video visit)          [x] No gaze palsy        [] Abnormal -          Skin: [x] No significant exanthematous lesions or discoloration noted on facial skin         [] Abnormal -            Psychiatric:       [x] Normal Affect [] Abnormal -        [x] No Hallucinations    Other pertinent observable physical exam findings:-      Assessment & Plan    ICD-10-CM ICD-9-CM    1. Dislocation of right shoulder joint, initial encounter  S43.004A 831.00 REFERRAL TO ORTHOPEDICS      2. Hemorrhoids, unspecified hemorrhoid type  K64.9 455.6 hydrocortisone (ANUSOL-HC) 2.5 % rectal cream      3. Acute vaginitis  N76.0 616.10 fluconazole (DIFLUCAN) 150 mg tablet        Diagnoses and all orders for this visit:    1. Dislocation of right shoulder joint, initial encounter  -     REFERRAL TO ORTHOPEDICS    2. Hemorrhoids, unspecified hemorrhoid type  -     hydrocortisone (ANUSOL-HC) 2.5 % rectal cream; Insert  into rectum four (4) times daily. 3. Acute vaginitis  -     fluconazole (DIFLUCAN) 150 mg tablet; Take 1 Tablet by mouth once for 1 dose. FDA advises cautious prescribing of oral fluconazole in pregnancy. Take 2nd tablet after 3 days if not better    Follow-up and Dispositions    Return if symptoms worsen or fail to improve, for shoulder pain, hemorrhoids, vaginitis . 1 Encouraged ortho evaluation, NSAID, Tylenol prn pain  2. Reviewed supportive care, prevention, indications for referral for surgery  3. Encouraged in person evaluation here, gyn provider  or with UC if symptoms persist   reviewed medications and side effects in detail    Patient voices understanding and acceptance of this advice and will call back if any further questions or concerns.    Dallas Collier NP

## 2023-05-24 RX ORDER — IBUPROFEN 800 MG/1
800 TABLET ORAL EVERY 8 HOURS PRN
COMMUNITY
Start: 2022-11-26

## 2023-05-26 ENCOUNTER — HOSPITAL ENCOUNTER (EMERGENCY)
Facility: HOSPITAL | Age: 28
Discharge: LEFT BEFORE DISCHARGE | End: 2023-05-26

## 2023-05-26 VITALS
RESPIRATION RATE: 16 BRPM | BODY MASS INDEX: 22.59 KG/M2 | OXYGEN SATURATION: 97 % | HEIGHT: 63 IN | WEIGHT: 127.5 LBS | SYSTOLIC BLOOD PRESSURE: 121 MMHG | DIASTOLIC BLOOD PRESSURE: 74 MMHG | TEMPERATURE: 98.7 F | HEART RATE: 68 BPM

## 2023-05-26 ASSESSMENT — PAIN DESCRIPTION - PAIN TYPE: TYPE: ACUTE PAIN

## 2023-05-26 ASSESSMENT — PAIN DESCRIPTION - ORIENTATION: ORIENTATION: LOWER

## 2023-05-26 ASSESSMENT — PAIN DESCRIPTION - LOCATION: LOCATION: BACK

## 2023-05-26 ASSESSMENT — PAIN - FUNCTIONAL ASSESSMENT: PAIN_FUNCTIONAL_ASSESSMENT: 0-10

## 2023-05-26 ASSESSMENT — PAIN DESCRIPTION - DESCRIPTORS: DESCRIPTORS: ACHING

## 2023-05-26 ASSESSMENT — PAIN SCALES - GENERAL: PAINLEVEL_OUTOF10: 6

## 2023-06-15 ENCOUNTER — TELEPHONE (OUTPATIENT)
Age: 28
End: 2023-06-15

## 2023-07-10 NOTE — TELEPHONE ENCOUNTER
I have attempted to contact this patient by phone with the following results: no answer, left message to return call.

## 2025-01-14 ENCOUNTER — HOSPITAL ENCOUNTER (EMERGENCY)
Facility: HOSPITAL | Age: 30
Discharge: HOME | End: 2025-01-14
Payer: MEDICAID

## 2025-01-14 VITALS
BODY MASS INDEX: 23.92 KG/M2 | HEIGHT: 63 IN | DIASTOLIC BLOOD PRESSURE: 94 MMHG | WEIGHT: 135 LBS | TEMPERATURE: 98 F | HEART RATE: 84 BPM | RESPIRATION RATE: 16 BRPM | SYSTOLIC BLOOD PRESSURE: 136 MMHG | OXYGEN SATURATION: 99 %

## 2025-01-14 DIAGNOSIS — N89.8 VAGINAL IRRITATION: Primary | ICD-10-CM

## 2025-01-14 DIAGNOSIS — Z11.3 SCREENING EXAMINATION FOR STD (SEXUALLY TRANSMITTED DISEASE): ICD-10-CM

## 2025-01-14 LAB
APPEARANCE UR: CLEAR
BACTERIA URNS QL MICRO: NEGATIVE /HPF
BILIRUB UR QL: NEGATIVE
CLUE CELLS VAG QL WET PREP: NORMAL
COLOR UR: NORMAL
EPITH CASTS URNS QL MICRO: NORMAL /LPF
GLUCOSE UR STRIP.AUTO-MCNC: NEGATIVE MG/DL
HCG UR QL: NEGATIVE
HGB UR QL STRIP: NEGATIVE
KETONES UR QL STRIP.AUTO: NEGATIVE MG/DL
LEUKOCYTE ESTERASE UR QL STRIP.AUTO: NEGATIVE
NITRITE UR QL STRIP.AUTO: NEGATIVE
PH UR STRIP: 8 (ref 5–8)
PROT UR STRIP-MCNC: NEGATIVE MG/DL
RBC #/AREA URNS HPF: NORMAL /HPF (ref 0–5)
SP GR UR REFRACTOMETRY: 1.01
T VAGINALIS VAG QL WET PREP: NORMAL
URINE CULTURE IF INDICATED: NORMAL
UROBILINOGEN UR QL STRIP.AUTO: 0.2 EU/DL (ref 0.2–1)
WBC URNS QL MICRO: NORMAL /HPF (ref 0–4)
YEAST: NORMAL

## 2025-01-14 PROCEDURE — 87491 CHLMYD TRACH DNA AMP PROBE: CPT

## 2025-01-14 PROCEDURE — 81025 URINE PREGNANCY TEST: CPT

## 2025-01-14 PROCEDURE — 99283 EMERGENCY DEPT VISIT LOW MDM: CPT

## 2025-01-14 PROCEDURE — 87591 N.GONORRHOEAE DNA AMP PROB: CPT

## 2025-01-14 PROCEDURE — 87210 SMEAR WET MOUNT SALINE/INK: CPT

## 2025-01-14 PROCEDURE — 81001 URINALYSIS AUTO W/SCOPE: CPT

## 2025-01-14 ASSESSMENT — PAIN - FUNCTIONAL ASSESSMENT: PAIN_FUNCTIONAL_ASSESSMENT: 0-10

## 2025-01-14 ASSESSMENT — PAIN SCALES - GENERAL: PAINLEVEL_OUTOF10: 4

## 2025-01-14 ASSESSMENT — PAIN DESCRIPTION - LOCATION: LOCATION: VAGINA

## 2025-01-14 ASSESSMENT — PAIN DESCRIPTION - DESCRIPTORS: DESCRIPTORS: OTHER (COMMENT)

## 2025-01-14 NOTE — ED TRIAGE NOTES
Pt presents with vaginal itching and irritation with sexual intercourse x 1 week with no bleeding or discharge.

## 2025-01-14 NOTE — DISCHARGE INSTRUCTIONS
Thank You!    It was a pleasure taking care of you in our Emergency Department today. We know that when you come to [facility], you are entrusting us with your health, comfort, and safety. Our clinicians honor that trust, and truly appreciate the opportunity to care for you and your loved ones.    If you receive a survey about your Emergency Department experience today, please fill it out.  We value your feedback. Thank you.      Lucinda Martinez PA-C    ___________________________________  I have included a copy of your lab results and/or radiologic studies from today's visit so you can have them easily available at your follow-up visit.   Recent Results (from the past 12 hour(s))   Wet prep, genital    Collection Time: 01/14/25  3:45 PM    Specimen: Miscellaneous sample   Result Value Ref Range    Clue Cells, Wet Prep NONE SEEN NOSEE      Trich, Wet Prep NONE SEEN NOSEE      Yeast, UA NONE SEEN NOSEE     Urinalysis with Reflex to Culture    Collection Time: 01/14/25  3:45 PM    Specimen: Urine   Result Value Ref Range    Color, UA YELLOW/STRAW      Appearance CLEAR CLEAR      Specific Gravity, UA 1.010      pH, Urine 8.0 5.0 - 8.0      Protein, UA Negative NEG mg/dL    Glucose, Ur Negative NEG mg/dL    Ketones, Urine Negative NEG mg/dL    Bilirubin, Urine Negative NEG      Blood, Urine Negative NEG      Urobilinogen, Urine 0.2 0.2 - 1.0 EU/dL    Nitrite, Urine Negative NEG      Leukocyte Esterase, Urine Negative NEG      WBC, UA 0-4 0 - 4 /hpf    RBC, UA 0-5 0 - 5 /hpf    Epithelial Cells, UA FEW FEW /lpf    BACTERIA, URINE Negative NEG /hpf    Urine Culture if Indicated CULTURE NOT INDICATED BY UA RESULT CNI     POC Pregnancy Urine Qual    Collection Time: 01/14/25  4:14 PM   Result Value Ref Range    Preg Test, Ur Negative NEG         No orders to display     [unfilled]

## 2025-01-14 NOTE — ED NOTES
Pt presents ambulatory to the ED c/o vaginal irritation and itching x 1 week. Pt denies d/c, dysuria, abdominal pain, or frequency. Pt does endorse urgency.     Emergency Department Nursing Plan of Care       The Nursing Plan of Care is developed from the Nursing assessment and Emergency Department Attending provider initial evaluation.  The plan of care may be reviewed in the “ED Provider note”.      The Plan of Care was developed with the following considerations:  Patient / Family readiness to learn indicated by:verbalized understanding  Persons(s) to be included in education: patient  Barriers to Learning/Limitations:None      Signed     MAURICIO HILARIO RN    1/14/2025   3:22 PM

## 2025-01-14 NOTE — ED PROVIDER NOTES
[facility] EMERGENCY DEPARTMENT  EMERGENCY DEPARTMENT ENCOUNTER       Pt Name: Hilda Verma  MRN: [id number]  Birthdate 1995  Date of evaluation: 1/14/2025  Provider: ESTELA Albarado   PCP: Cassidy Wilson APRN - NP  Note Started: 4:50 PM EST 1/14/25     CHIEF COMPLAINT       Chief Complaint   Patient presents with    Vaginal Itching        HISTORY OF PRESENT ILLNESS: 1 or more elements      History From: Patient  None     Hilda Verma is a 29 y.o. female who presents to the ED for evaluation of vaginal irritation intermittently for the past week.  Patient states she is sexually active but denies any known STD exposures.  Denies any urinary urgency, frequency, burning, hematuria.  Denies any vaginal discharge or bleeding.  Denies any rashes, bumps, lesions to groin.  Denies fevers, chest pain, shortness of breath abdominal pain, nausea vomiting, diarrhea.  States she is otherwise in her usual state of health.     Nursing Notes were all reviewed and agreed with or any disagreements were addressed in the HPI.     REVIEW OF SYSTEMS      Review of Systems   All other systems reviewed and are negative.       Positives and Pertinent negatives as per HPI.    PAST HISTORY     Past Medical History:  Past Medical History:   Diagnosis Date    Anemia     Contact dermatitis and other eczema, due to unspecified cause     Migraines        Past Surgical History:  Past Surgical History:   Procedure Laterality Date    OTHER SURGICAL HISTORY      eye surgery as a baby    OTHER SURGICAL HISTORY      gum surgery as a child       Family History:  Family History   Problem Relation Age of Onset    Asthma Mother     Hypertension Mother     Hypertension Father        Social History:  Social History     Tobacco Use    Smoking status: Every Day    Smokeless tobacco: Never   Substance Use Topics    Alcohol use: Yes    Drug use: Not Currently       Allergies:  No Known Allergies        PHYSICAL EXAM      ED Triage Vitals

## 2025-06-11 NOTE — ED NOTES
Discharged by provider.
Department  [address] [facility] [address] [phone]    If symptoms worsen    Josef Stone MD  [address] [facility] [address] [phone]    Schedule an appointment as soon as possible for a visit in 1 day      Jairo Up MD  [address] [facility]  [address]  [facility] [address] [phone]    Schedule an appointment as soon as possible for a visit in 1 day  Cardiology      DISPOSITION MEDICATIONS (if applicable):  New Prescriptions    ACETAMINOPHEN (TYLENOL) 500 MG TABLET    Take 1 tablet by mouth 4 times daily as needed for Pain    ASPIRIN (ASPIRIN CHILDRENS) 81 MG CHEWABLE TABLET    Take 1 tablet by mouth daily    NAPROXEN (NAPROSYN) 500 MG TABLET    Take 1 tablet by mouth 2 times daily    ONDANSETRON (ZOFRAN-ODT) 4 MG DISINTEGRATING TABLET    Take 1 tablet by mouth 3 times daily as needed for Nausea or Vomiting          DO Malissa Doshi James, DO  06/11/25 0105